# Patient Record
Sex: MALE | Race: WHITE | Employment: OTHER | URBAN - METROPOLITAN AREA
[De-identification: names, ages, dates, MRNs, and addresses within clinical notes are randomized per-mention and may not be internally consistent; named-entity substitution may affect disease eponyms.]

---

## 2017-01-02 ENCOUNTER — PATIENT OUTREACH (OUTPATIENT)
Dept: CASE MANAGEMENT | Age: 82
End: 2017-01-02

## 2017-01-02 NOTE — PROGRESS NOTES
Patient's daughter identified with a potential need for Chronic Care Management services. Called patient to introduce self and availability of Chronic Care Management services.   Patient's daughter informed about the following service elements:  · Health i

## 2017-02-17 ENCOUNTER — HOSPITAL ENCOUNTER (OUTPATIENT)
Dept: CT IMAGING | Facility: HOSPITAL | Age: 82
Discharge: HOME | End: 2017-02-17
Attending: NURSE PRACTITIONER
Payer: MEDICARE

## 2017-02-17 ENCOUNTER — TELEPHONE (OUTPATIENT)
Dept: FAMILY MEDICINE CLINIC | Facility: CLINIC | Age: 82
End: 2017-02-17

## 2017-02-17 ENCOUNTER — OFFICE VISIT (OUTPATIENT)
Dept: FAMILY MEDICINE CLINIC | Facility: CLINIC | Age: 82
End: 2017-02-17

## 2017-02-17 VITALS
SYSTOLIC BLOOD PRESSURE: 150 MMHG | HEART RATE: 76 BPM | TEMPERATURE: 97 F | DIASTOLIC BLOOD PRESSURE: 94 MMHG | RESPIRATION RATE: 24 BRPM

## 2017-02-17 DIAGNOSIS — N18.30 CKD STAGE 3 DUE TO TYPE 2 DIABETES MELLITUS (HCC): Chronic | ICD-10-CM

## 2017-02-17 DIAGNOSIS — E11.22 CKD STAGE 3 DUE TO TYPE 2 DIABETES MELLITUS (HCC): Chronic | ICD-10-CM

## 2017-02-17 DIAGNOSIS — R10.13 EPIGASTRIC ABDOMINAL PAIN: ICD-10-CM

## 2017-02-17 DIAGNOSIS — K21.9 GASTROESOPHAGEAL REFLUX DISEASE WITHOUT ESOPHAGITIS: Chronic | ICD-10-CM

## 2017-02-17 DIAGNOSIS — K43.9 VENTRAL HERNIA WITHOUT OBSTRUCTION OR GANGRENE: ICD-10-CM

## 2017-02-17 DIAGNOSIS — E11.9 CONTROLLED TYPE 2 DIABETES MELLITUS WITHOUT COMPLICATION, WITHOUT LONG-TERM CURRENT USE OF INSULIN (HCC): Chronic | ICD-10-CM

## 2017-02-17 DIAGNOSIS — I10 ESSENTIAL HYPERTENSION: Chronic | ICD-10-CM

## 2017-02-17 DIAGNOSIS — I70.90 ATHEROSCLEROSIS: ICD-10-CM

## 2017-02-17 DIAGNOSIS — R10.12 LUQ PAIN: ICD-10-CM

## 2017-02-17 DIAGNOSIS — E78.00 PURE HYPERCHOLESTEROLEMIA: Chronic | ICD-10-CM

## 2017-02-17 DIAGNOSIS — R10.12 LUQ PAIN: Primary | ICD-10-CM

## 2017-02-17 PROCEDURE — 99214 OFFICE O/P EST MOD 30 MIN: CPT | Performed by: NURSE PRACTITIONER

## 2017-02-17 PROCEDURE — 93000 ELECTROCARDIOGRAM COMPLETE: CPT | Performed by: NURSE PRACTITIONER

## 2017-02-17 PROCEDURE — 74176 CT ABD & PELVIS W/O CONTRAST: CPT

## 2017-02-17 NOTE — PROGRESS NOTES
Rosemary Mcpherson is a 80year old male. S:  Patient presents today with daughter for c/o LUQ/epigastric abdominal pain for the past 5 days with worsening in the last 2 days. Cough and lying down aggravates symptoms.  Denies any runny nose, nasal congesti insulin (hcc)  Ckd stage 3 due to type 2 diabetes mellitus (hcc)    No orders of the defined types were placed in this encounter.        Meds & Refills for this Visit:  No prescriptions requested or ordered in this encounter    Imaging & Consults:  [facility]

## 2017-02-17 NOTE — TELEPHONE ENCOUNTER
Leah Holt states that pt has a cough for past 2-3 days and feels a pulling at the bottom of right ribs. Denies chest pain or SOB. Afebrile. Appt scheduled today to be assessed.

## 2017-02-18 ENCOUNTER — TELEPHONE (OUTPATIENT)
Dept: FAMILY MEDICINE CLINIC | Facility: CLINIC | Age: 82
End: 2017-02-18

## 2017-02-19 ENCOUNTER — HOSPITAL ENCOUNTER (OUTPATIENT)
Facility: HOSPITAL | Age: 82
Setting detail: OBSERVATION
Discharge: HOME | End: 2017-02-22
Attending: EMERGENCY MEDICINE | Admitting: HOSPITALIST
Payer: MEDICARE

## 2017-02-19 ENCOUNTER — APPOINTMENT (OUTPATIENT)
Dept: GENERAL RADIOLOGY | Facility: HOSPITAL | Age: 82
End: 2017-02-19
Attending: EMERGENCY MEDICINE
Payer: MEDICARE

## 2017-02-19 DIAGNOSIS — R10.9 ABDOMINAL PAIN OF UNKNOWN ETIOLOGY: ICD-10-CM

## 2017-02-19 DIAGNOSIS — R07.9 ACUTE CHEST PAIN: Primary | ICD-10-CM

## 2017-02-19 DIAGNOSIS — N28.9 RENAL INSUFFICIENCY: ICD-10-CM

## 2017-02-19 DIAGNOSIS — R33.9 URINARY RETENTION: ICD-10-CM

## 2017-02-19 DIAGNOSIS — N32.0 BLADDER NECK CONTRACTURE: ICD-10-CM

## 2017-02-19 PROBLEM — Z91.81 AT RISK FOR FALLING: Status: ACTIVE | Noted: 2017-02-19

## 2017-02-19 LAB
ALBUMIN SERPL-MCNC: 3.3 G/DL (ref 3.5–4.8)
ALP LIVER SERPL-CCNC: 102 U/L (ref 45–117)
ALT SERPL-CCNC: 16 U/L (ref 17–63)
AST SERPL-CCNC: 21 U/L (ref 15–41)
BASOPHILS # BLD AUTO: 0.05 X10(3) UL (ref 0–0.1)
BASOPHILS NFR BLD AUTO: 0.5 %
BILIRUB SERPL-MCNC: 0.6 MG/DL (ref 0.1–2)
BILIRUB UR QL STRIP.AUTO: NEGATIVE
BUN BLD-MCNC: 27 MG/DL (ref 8–20)
CALCIUM BLD-MCNC: 8.2 MG/DL (ref 8.3–10.3)
CHLORIDE: 106 MMOL/L (ref 101–111)
CLARITY UR REFRACT.AUTO: CLEAR
CO2: 25 MMOL/L (ref 22–32)
COLOR UR AUTO: YELLOW
CREAT BLD-MCNC: 2.09 MG/DL (ref 0.7–1.3)
EOSINOPHIL # BLD AUTO: 0.36 X10(3) UL (ref 0–0.3)
EOSINOPHIL NFR BLD AUTO: 3.9 %
ERYTHROCYTE [DISTWIDTH] IN BLOOD BY AUTOMATED COUNT: 12.8 % (ref 11.5–16)
GLUCOSE BLD-MCNC: 192 MG/DL (ref 70–99)
GLUCOSE BLD-MCNC: 212 MG/DL (ref 65–99)
GLUCOSE UR STRIP.AUTO-MCNC: 50 MG/DL
HCT VFR BLD AUTO: 37.2 % (ref 37–53)
HGB BLD-MCNC: 12.7 G/DL (ref 13–17)
IMMATURE GRANULOCYTE COUNT: 0.03 X10(3) UL (ref 0–1)
IMMATURE GRANULOCYTE RATIO %: 0.3 %
KETONES UR STRIP.AUTO-MCNC: NEGATIVE MG/DL
LEUKOCYTE ESTERASE UR QL STRIP.AUTO: NEGATIVE
LIPASE: 200 U/L (ref 73–393)
LYMPHOCYTES # BLD AUTO: 1.69 X10(3) UL (ref 0.9–4)
LYMPHOCYTES NFR BLD AUTO: 18.1 %
M PROTEIN MFR SERPL ELPH: 7.4 G/DL (ref 6.1–8.3)
MCH RBC QN AUTO: 31.6 PG (ref 27–33.2)
MCHC RBC AUTO-ENTMCNC: 34.1 G/DL (ref 31–37)
MCV RBC AUTO: 92.5 FL (ref 80–99)
MONOCYTES # BLD AUTO: 1.2 X10(3) UL (ref 0.1–0.6)
MONOCYTES NFR BLD AUTO: 12.9 %
NEUTROPHIL ABS PRELIM: 6 X10 (3) UL (ref 1.3–6.7)
NEUTROPHILS # BLD AUTO: 6 X10(3) UL (ref 1.3–6.7)
NEUTROPHILS NFR BLD AUTO: 64.3 %
NITRITE UR QL STRIP.AUTO: NEGATIVE
PH UR STRIP.AUTO: 5 [PH] (ref 4.5–8)
PLATELET # BLD AUTO: 201 10(3)UL (ref 150–450)
POTASSIUM SERPL-SCNC: 4.2 MMOL/L (ref 3.6–5.1)
PROT UR STRIP.AUTO-MCNC: 30 MG/DL
RBC # BLD AUTO: 4.02 X10(6)UL (ref 3.8–5.8)
RBC #/AREA URNS AUTO: >10 /HPF
RED CELL DISTRIBUTION WIDTH-SD: 43.4 FL (ref 35.1–46.3)
SODIUM SERPL-SCNC: 138 MMOL/L (ref 136–144)
SP GR UR STRIP.AUTO: 1.02 (ref 1–1.03)
TROPONIN: <0.046 NG/ML (ref ?–0.05)
UROBILINOGEN UR STRIP.AUTO-MCNC: <2 MG/DL
WBC # BLD AUTO: 9.3 X10(3) UL (ref 4–13)

## 2017-02-19 PROCEDURE — 99220 INITIAL OBSERVATION CARE,LEVL III: CPT | Performed by: HOSPITALIST

## 2017-02-19 PROCEDURE — 71010 XR CHEST AP PORTABLE  (CPT=71010): CPT

## 2017-02-19 RX ORDER — MORPHINE SULFATE 2 MG/ML
2 INJECTION, SOLUTION INTRAMUSCULAR; INTRAVENOUS ONCE
Status: COMPLETED | OUTPATIENT
Start: 2017-02-19 | End: 2017-02-19

## 2017-02-19 RX ORDER — ONDANSETRON 2 MG/ML
4 INJECTION INTRAMUSCULAR; INTRAVENOUS EVERY 4 HOURS PRN
Status: DISCONTINUED | OUTPATIENT
Start: 2017-02-19 | End: 2017-02-20

## 2017-02-19 RX ORDER — LIDOCAINE HYDROCHLORIDE 20 MG/ML
10 JELLY TOPICAL ONCE
Status: COMPLETED | OUTPATIENT
Start: 2017-02-19 | End: 2017-02-19

## 2017-02-19 RX ORDER — ASPIRIN 81 MG/1
324 TABLET, CHEWABLE ORAL ONCE
Status: COMPLETED | OUTPATIENT
Start: 2017-02-19 | End: 2017-02-19

## 2017-02-19 RX ORDER — SULFAMETHOXAZOLE AND TRIMETHOPRIM 800; 160 MG/1; MG/1
1 TABLET ORAL EVERY 12 HOURS SCHEDULED
Status: COMPLETED | OUTPATIENT
Start: 2017-02-19 | End: 2017-02-20

## 2017-02-19 NOTE — ED INITIAL ASSESSMENT (HPI)
Pt. Complaining of chest pain that started after eating. Lasted for 3 mins. Worst when he laid down.

## 2017-02-20 ENCOUNTER — ANESTHESIA EVENT (OUTPATIENT)
Dept: SURGERY | Facility: HOSPITAL | Age: 82
End: 2017-02-20
Payer: MEDICARE

## 2017-02-20 ENCOUNTER — APPOINTMENT (OUTPATIENT)
Dept: CV DIAGNOSTICS | Facility: HOSPITAL | Age: 82
End: 2017-02-20
Attending: HOSPITALIST
Payer: MEDICARE

## 2017-02-20 PROBLEM — R33.8 ACUTE RETENTION OF URINE: Status: ACTIVE | Noted: 2017-02-20

## 2017-02-20 LAB
ATRIAL RATE: 80 BPM
BUN BLD-MCNC: 23 MG/DL (ref 8–20)
CALCIUM BLD-MCNC: 8.2 MG/DL (ref 8.3–10.3)
CHLORIDE: 107 MMOL/L (ref 101–111)
CO2: 26 MMOL/L (ref 22–32)
CREAT BLD-MCNC: 1.88 MG/DL (ref 0.7–1.3)
ERYTHROCYTE [DISTWIDTH] IN BLOOD BY AUTOMATED COUNT: 12.9 % (ref 11.5–16)
EST. AVERAGE GLUCOSE BLD GHB EST-MCNC: 154 MG/DL (ref 68–126)
GLUCOSE BLD-MCNC: 108 MG/DL (ref 70–99)
GLUCOSE BLD-MCNC: 121 MG/DL (ref 65–99)
GLUCOSE BLD-MCNC: 136 MG/DL (ref 65–99)
GLUCOSE BLD-MCNC: 138 MG/DL (ref 65–99)
GLUCOSE BLD-MCNC: 167 MG/DL (ref 65–99)
GLUCOSE BLD-MCNC: 92 MG/DL (ref 65–99)
HBA1C MFR BLD HPLC: 7 % (ref ?–5.7)
HCT VFR BLD AUTO: 36.3 % (ref 37–53)
HGB BLD-MCNC: 12 G/DL (ref 13–17)
MCH RBC QN AUTO: 31.3 PG (ref 27–33.2)
MCHC RBC AUTO-ENTMCNC: 33.1 G/DL (ref 31–37)
MCV RBC AUTO: 94.5 FL (ref 80–99)
P AXIS: -14 DEGREES
P-R INTERVAL: 156 MS
PLATELET # BLD AUTO: 174 10(3)UL (ref 150–450)
POTASSIUM SERPL-SCNC: 4.1 MMOL/L (ref 3.6–5.1)
Q-T INTERVAL: 400 MS
QRS DURATION: 80 MS
QTC CALCULATION (BEZET): 461 MS
R AXIS: 55 DEGREES
RBC # BLD AUTO: 3.84 X10(6)UL (ref 3.8–5.8)
RED CELL DISTRIBUTION WIDTH-SD: 44.2 FL (ref 35.1–46.3)
SODIUM SERPL-SCNC: 139 MMOL/L (ref 136–144)
T AXIS: 60 DEGREES
TROPONIN: <0.046 NG/ML (ref ?–0.05)
VENTRICULAR RATE: 80 BPM
WBC # BLD AUTO: 8.2 X10(3) UL (ref 4–13)

## 2017-02-20 PROCEDURE — 93306 TTE W/DOPPLER COMPLETE: CPT

## 2017-02-20 PROCEDURE — 93306 TTE W/DOPPLER COMPLETE: CPT | Performed by: INTERNAL MEDICINE

## 2017-02-20 PROCEDURE — 99225 SUBSEQUENT OBSERVATION CARE: CPT | Performed by: INTERNAL MEDICINE

## 2017-02-20 RX ORDER — ACETAMINOPHEN 325 MG/1
650 TABLET ORAL EVERY 6 HOURS PRN
Status: DISCONTINUED | OUTPATIENT
Start: 2017-02-20 | End: 2017-02-20

## 2017-02-20 RX ORDER — ACETAMINOPHEN 325 MG/1
650 TABLET ORAL EVERY 4 HOURS PRN
Status: DISCONTINUED | OUTPATIENT
Start: 2017-02-20 | End: 2017-02-22

## 2017-02-20 RX ORDER — HYDRALAZINE HYDROCHLORIDE 20 MG/ML
10 INJECTION INTRAMUSCULAR; INTRAVENOUS
Status: DISCONTINUED | OUTPATIENT
Start: 2017-02-20 | End: 2017-02-22

## 2017-02-20 RX ORDER — POLYETHYLENE GLYCOL 3350 17 G/17G
17 POWDER, FOR SOLUTION ORAL DAILY PRN
Status: DISCONTINUED | OUTPATIENT
Start: 2017-02-20 | End: 2017-02-22

## 2017-02-20 RX ORDER — HYDROCODONE BITARTRATE AND ACETAMINOPHEN 5; 325 MG/1; MG/1
1 TABLET ORAL EVERY 4 HOURS PRN
Status: DISCONTINUED | OUTPATIENT
Start: 2017-02-20 | End: 2017-02-22

## 2017-02-20 RX ORDER — HEPARIN SODIUM 5000 [USP'U]/ML
5000 INJECTION, SOLUTION INTRAVENOUS; SUBCUTANEOUS EVERY 8 HOURS
Status: DISCONTINUED | OUTPATIENT
Start: 2017-02-20 | End: 2017-02-22

## 2017-02-20 RX ORDER — HYDRALAZINE HYDROCHLORIDE 25 MG/1
25 TABLET, FILM COATED ORAL 2 TIMES DAILY
Status: DISCONTINUED | OUTPATIENT
Start: 2017-02-20 | End: 2017-02-21

## 2017-02-20 RX ORDER — DOCUSATE SODIUM 100 MG/1
100 CAPSULE, LIQUID FILLED ORAL 2 TIMES DAILY
Status: DISCONTINUED | OUTPATIENT
Start: 2017-02-20 | End: 2017-02-22

## 2017-02-20 RX ORDER — PANTOPRAZOLE SODIUM 40 MG/1
40 TABLET, DELAYED RELEASE ORAL
Status: DISCONTINUED | OUTPATIENT
Start: 2017-02-20 | End: 2017-02-22

## 2017-02-20 RX ORDER — DIPHENHYDRAMINE HCL 25 MG
25 CAPSULE ORAL NIGHTLY PRN
Status: DISCONTINUED | OUTPATIENT
Start: 2017-02-20 | End: 2017-02-22

## 2017-02-20 RX ORDER — ASPIRIN 325 MG
325 TABLET, DELAYED RELEASE (ENTERIC COATED) ORAL DAILY
Status: DISCONTINUED | OUTPATIENT
Start: 2017-02-20 | End: 2017-02-22

## 2017-02-20 RX ORDER — DEXTROSE MONOHYDRATE 25 G/50ML
50 INJECTION, SOLUTION INTRAVENOUS
Status: DISCONTINUED | OUTPATIENT
Start: 2017-02-20 | End: 2017-02-22

## 2017-02-20 RX ORDER — LOSARTAN POTASSIUM 50 MG/1
50 TABLET ORAL DAILY
Status: DISCONTINUED | OUTPATIENT
Start: 2017-02-20 | End: 2017-02-22

## 2017-02-20 RX ORDER — ATORVASTATIN CALCIUM 20 MG/1
20 TABLET, FILM COATED ORAL
Status: DISCONTINUED | OUTPATIENT
Start: 2017-02-20 | End: 2017-02-22

## 2017-02-20 RX ORDER — METOPROLOL TARTRATE 100 MG/1
100 TABLET ORAL
Status: DISCONTINUED | OUTPATIENT
Start: 2017-02-20 | End: 2017-02-22

## 2017-02-20 RX ORDER — HYDROCODONE BITARTRATE AND ACETAMINOPHEN 5; 325 MG/1; MG/1
2 TABLET ORAL EVERY 4 HOURS PRN
Status: DISCONTINUED | OUTPATIENT
Start: 2017-02-20 | End: 2017-02-22

## 2017-02-20 RX ORDER — SODIUM PHOSPHATE, DIBASIC AND SODIUM PHOSPHATE, MONOBASIC 7; 19 G/133ML; G/133ML
1 ENEMA RECTAL ONCE AS NEEDED
Status: ACTIVE | OUTPATIENT
Start: 2017-02-20 | End: 2017-02-20

## 2017-02-20 RX ORDER — BISACODYL 10 MG
10 SUPPOSITORY, RECTAL RECTAL
Status: DISCONTINUED | OUTPATIENT
Start: 2017-02-20 | End: 2017-02-22

## 2017-02-20 RX ORDER — ONDANSETRON 2 MG/ML
4 INJECTION INTRAMUSCULAR; INTRAVENOUS EVERY 6 HOURS PRN
Status: DISCONTINUED | OUTPATIENT
Start: 2017-02-20 | End: 2017-02-22

## 2017-02-20 NOTE — ANESTHESIA PREPROCEDURE EVALUATION
PRE-OP EVALUATION    Patient Name: June Huerta    Pre-op Diagnosis: INPT    Procedure(s):  CYSTOSCOPY, TRANSURETHRAL INCISION OF BLADDER NECK CONTRACTURE     Surgeon(s) and Role:     * Jasmyn Hernández MD - Primary    Pre-op vitals reviewed.   Temp: 98.2 Subcutaneous TID CC and HS   diphenhydrAMINE (BENADRYL) cap/tab 25 mg 25 mg Oral Nightly PRN   [START ON 2/21/2017] CeFAZolin Sodium (ANCEF/KEFZOL) IVPB premix 2 g 2 g Intravenous On Call to OR   [COMPLETED] aspirin chewable tab 324 mg 324 mg Oral Once   [ Neuro/Psych                              [place]  Macular degeneration  BPH      ECHO:  Left ventricle: The cavity size was normal. Wall thickness was normal.     Systolic function was normal. The estimated ejection fraction was 55-60%.      There was no Plan: general  NPO status verified and patient meets guidelines. Patient has taken beta blockers in last 24 hours. Post-procedure pain management plan discussed with surgeon and patient.       Plan/risks discussed with: patient and child/children

## 2017-02-20 NOTE — PROGRESS NOTES
[facility]  Urology Progress Note    Monique Folod Patient Status:  Observation    3/16/1929 MRN [id number]   [facility] 8NE-A Attending Sushant Landis MD   Hosp Day # 1 PCP Marlys Foley MD     Subjective:   Monique Flood is a(n) 8 anesthesia risks, bleeding, infection with patient and daughter who agree and understand.      Await cardiac clearance  NPO after MN  Hold aspirin and heparin the morning of procedure  Consent to be signed  Ancef on call to [address]  [facility]

## 2017-02-20 NOTE — PHYSICAL THERAPY NOTE
PHYSICAL THERAPY QUICK EVALUATION - INPATIENT    Room Number: 8934/2024-O  Evaluation Date: 2/20/2017  Presenting Problem: acute abdominal and chest pain  Physician Order: PT Eval and Treat    Problem List  Principal Problem:    Acute chest pain  Active Pr unfamiliar environment and higher risk of falling. Pt and daughter confirm no recent falls. SUBJECTIVE  \"I haven't moved much since yesterday so I feel a little off balance. \"    OBJECTIVE  Precautions: Hard of hearing (legal blindness)  Fall Risk: Hig is corrected with verbal/tactile cues. Pt ambulates x 300 ft c HHA, Min A. Pt demonstrates decreased branden, wide KRISTIAN, high guard position of UEs, and decreased B foot clearance/step lengths.  Pt able to improve posture and increase step lengths/foot angelito patient) at least TID in order to prevent functional decline. PT Discharge Recommendations: 24 hour care/supervision;Home with home health PT    PLAN  Patient has been evaluated and presents with no skilled Physical Therapy needs at this time.   Sergio

## 2017-02-20 NOTE — ED NOTES
attempt to insert gonzalez cath fr 16 unsuccesful. Tried 16 fr coude cath. Unsuccessful. Able to drain <200ml of urine but unable to fully insert cath. initial urine was clear. succeeding  Urine was little bloody. Dr roth informed.

## 2017-02-20 NOTE — ED PROVIDER NOTES
Patient Seen in: [facility] 8ne-a    History   Patient presents with:  Chest Pain Angina (cardiovascular)    Stated Complaint: chest pain    HPI    Patient is a 78-year-old male who over the past week has had epigastric abdominal pain.   Patient was EC,  Take 1 tablet by mouth daily.        Family History   Problem Relation Age of Onset   • Cancer Father    • pneumonia [Other] [OTHER] Mother          Smoking Status: Former Smoker                   Packs/Day: 0.00  Years:           Quit date: 03/03/2002 Creatinine 2.09 (*)     GFR 28 (*)     Calcium, Total 8.2 (*)     Alt 16 (*)     Albumin 3.3 (*)     All other components within normal limits   URINALYSIS WITH CULTURE REFLEX - Abnormal; Notable for the following:     Glucose Urine 50  (*)     Blood Urine Minus Newton.         Disposition and Plan     Clinical Impression:  Acute chest pain  (primary encounter diagnosis)  Abdominal pain of unknown etiology  Urinary retention  Renal insufficiency    Disposition:  Admit    Follow-up:  No follow-up provider specifi

## 2017-02-20 NOTE — H&P
[facility] HOSPITAL[facility]  History and Physical     Brook Hien Patient Status:  Observation    3/16/1929 MRN [id number]   [facility] 8NE-A Attending Zita Rivas [address] Day # 1 PCP Kathy Gotti MD     Chief Complaint: Richard Gentry SURGICAL HISTORY      Comment polys remove by [facility]       Social History:  reports that he quit smoking about 14 years ago. He has never used smokeless tobacco. He reports that he does not drink alcohol or use illicit drugs.     Family History:   Family H gallops. Equal pulses. Chest and Back: No tenderness or deformity. Abdomen: Soft, nontender, nondistended. Positive bowel sounds. No rebound, guarding or organomegaly. Neurologic: No focal neurological deficits. CNII-XII grossly intact.   Musculoskelet Lawrence.  9.  Blindness-legally blind secondary to macular degeneration. Quality:  · DVT Prophylaxis: Heparin  · CODE status: Full  · Bravo: Placed in the ER    Plan of care discussed with patient and daughter at bedside.     Skylar Marmolejo DO  2

## 2017-02-20 NOTE — CONSULTS
[facility]  Report of Consultation    Shoaib Douglas Patient Status:  Observation    3/16/1929 MRN [id number]   [facility] 8NE-A Attending Mahesh Sal MD   Hosp Day # 1 PCP Shira Beth MD     Reason for Consultation:  Cardiac angelito Trivial regurgitation. 3. Left atrium: The left atrial volume was mildly increased. 4. Right ventricle: The cavity size was mildly increased. Impressions:  This study is compared with previous dated 08/31/2015: NO  INTERVAL CHANGES.     Stress Test: 08/2 (APRESOLINE) tab 25 mg, 25 mg, Oral, BID  •  Losartan Potassium (COZAAR) tab 50 mg, 50 mg, Oral, Daily  •  Metoprolol Tartrate (LOPRESSOR) tab 100 mg, 100 mg, Oral, Daily Beta Blocker  •  Pantoprazole Sodium (PROTONIX) EC tab 40 mg, 40 mg, Oral, QAM AC  • Max:98.6 °F (37 °C)    Wt Readings from Last 3 Encounters:  02/19/17 : 163 lb (73.936 kg)  10/14/16 : 164 lb (74.39 kg)  04/15/16 : 164 lb (74.39 kg)      General: Alert and oriented in no apparent distress.   HEENT: legally blind, hearing deficit  Neck: No in caliber. No evidence of obstruction. Colonic diverticulosis without evidence of diverticulitis. Postsurgical changes noted within the ascending portion of the colon. Small bowel containing ventral hernias.  PELVIS:  Distended bladder with trabeculations 13.3 11/10/2013   PT 13.4 10/17/2011   INR 1.05 11/10/2013   INR 1.01 10/17/2011          Lab Results  Component Value Date   WBC 8.2 02/20/2017   HGB 12.0 02/20/2017   HCT 36.3 02/20/2017   .0 02/20/2017   CREATSERUM 1.88 02/20/2017   BUN 23 02/20/ evidence for regional wall motion abnormalities.     Doppler parameters are consistent with abnormal left ventricular     relaxation - grade 1 diastolic dysfunction. 2. Aortic valve: Trivial regurgitation.   3. Left atrium: The left atrial volume was mildl

## 2017-02-20 NOTE — CONSULTS
[facility]  Report of Consultation    Sahil Haynes Patient Status:  Emergency    3/16/1929 MRN [id number]   Location [address] [facility] Attending Xenia Uriarte MD   Hosp Day # 0 PCP Evelin Harmon MD     Impression and Plan:  Ur 100 MG Oral Tab Take 1 tablet (100 mg total) by mouth daily. Disp: 90 tablet Rfl: 3   Losartan Potassium (COZAAR) 50 MG Oral Tab Take 1 tablet (50 mg total) by mouth daily.  Disp: 90 tablet Rfl: 3   glyBURIDE 2.5 MG Oral Tab Take 1 tablet (2.5 mg total) by 76  Temp(Src) 98.6 °F (37 °C) (Temporal)  Resp 16  Ht 5' 7\" (1.702 m)  Wt 163 lb (73.936 kg)  BMI 25.52 kg/m2  SpO2 96%  General: Alert, orientated x3. Cooperative. No apparent distress. HEENT: Exam is unremarkable.    Abdomen:  Soft, non-distended, non through it and then [place] sounds were used to dilated from 12 [place] up to 32 [place].   An 25 [place] Crow tip catheter and a 12 [place] Crow tip catheter could not be advanced into the bladder but a 15 Azeri catheter was advanced over the guidewire

## 2017-02-20 NOTE — HISTORICAL OFFICE NOTE
Luke Glendale  : 1929  ACCOUNT:  [id number]  [phone]  PCP: Dr. Naeem Cohen     TODAY'S DATE: 2016  DICTATED BY:  Lorina Feil Frommelt, APN]    CHIEF COMPLAINT: [Followup of .  CAD, of native vessels.]    HPI: [On 2016, mayuri Rivas see below    VITAL SIGNS: [B/P - 130/80, Pulse - 62, Weight - 164, Height - 66, BMI - 26.5 ]    CONS: well developed, well nourished. WEIGHT: BMI parameters reviewed and discussed. HEAD/FACE: no trauma and normocephalic. EYES: severe macular degeneration. EVELYN Tucker - DD: 2016 - DT: 2016 - Job ID: [id number]    ___________________________________________________________    Armani Rasheed  : 1929  ACCOUNT:  [id number]  [phone]  PCP: Dr. Kamala Rea    TODAY'S DATE: exercise. DIET: no special diet. MARITAL STATUS: . OCCUPATION: retired.     ALLERGIES: No Known Allergies    MEDICATIONS: Selected prescriptions see below    VITAL SIGNS: [B/P - 114/60 , Pulse - 64, Weight -  165, Height -   66 , BMI - 26.6 ]    CONS

## 2017-02-20 NOTE — PROGRESS NOTES
[facility]ITAL[facility]  Progress Note     Debby Kelley Patient Status:  Observation    3/16/1929 MRN [id number]   [facility] 8NE-A Attending Maylin Chiu MD   Hosp Day # 1 PCP Emma Olivarez MD     Chief Complaint: chest pain with abdominal Sulfamethoxazole-TMP DS  1 tablet Oral 2 times per day       ASSESSMENT / PLAN:     1. 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  Resolved after gonzalez   Appreciate uro consult: requires outpat

## 2017-02-20 NOTE — PLAN OF CARE
Pt admit overnight w/ cp. Trops have been (-) x2. Pt A&O, denies pain. Legally blind, [place].  VSS. On RA. NSR on tele. Bravo cath remains in place- inserted by urology for retention. Pt to have surgery tomorrow- hold subQ heparin and aspirin.   Pt and erma

## 2017-02-20 NOTE — DISCHARGE SUMMARY
[facility]PITAL[facility]  DISCHARGE SUMMARY     Gianna Canales Patient Status:  Observation    3/16/1929 MRN [id number]   [facility] 8NE-A Attending Buddy Warner MD   [facility] Day # 3 PCP Argelia Garcia MD     Date of Admission: 2017  Date of D dizziness, lightheadedness, or syncope.  No headache, sinus/nasal congestion, or cough.  Patient denies any numbness or tingling in extremities or any focal weakness. Brief Synopsis: Patient was admitted after urology placed a Bravo in the ER overnight. Greek resectoscope sheath was placed under direct vision. The hot knife was used to incise the bladder neck at the 3, 6, and 9 o'clock positions. The view from the veru was wide open after resection.  Hemostasis was noted to be excellent with minimal use o tablet   Refills:  1       Losartan Potassium 50 MG Tabs   Last time this was given:  50 mg on 2/22/2017  8:50 AM   Commonly known as:  COZAAR        Take 1 tablet (50 mg total) by mouth daily.     Quantity:  90 tablet   Refills:  3       Metoprolol Tartrat deficits. Musculoskeletal: Moves all extremities. Extremities: No edema.   -----------------------------------------------------------------------------------------------  PATIENT DISCHARGE INSTRUCTIONS: See electronic chart    Vita Lindquist MD 2/22/2017

## 2017-02-20 NOTE — CM/SW NOTE
02/20/17 1400   CM/SW Referral Data   Referral Source Physician   Reason for Referral Discharge planning   Informant Children   Pertinent Medical Hx   Primary Care Physician Name Arpita Shellpam   Patient Info   Patient Communication Issues Hearing impairm

## 2017-02-21 ENCOUNTER — ANESTHESIA (OUTPATIENT)
Dept: SURGERY | Facility: HOSPITAL | Age: 82
End: 2017-02-21
Payer: MEDICARE

## 2017-02-21 ENCOUNTER — SURGERY (OUTPATIENT)
Age: 82
End: 2017-02-21

## 2017-02-21 LAB
GLUCOSE BLD-MCNC: 132 MG/DL (ref 65–99)
GLUCOSE BLD-MCNC: 219 MG/DL (ref 65–99)
GLUCOSE BLD-MCNC: 93 MG/DL (ref 65–99)

## 2017-02-21 PROCEDURE — 0TND8ZZ RELEASE URETHRA, VIA NATURAL OR ARTIFICIAL OPENING ENDOSCOPIC: ICD-10-PCS | Performed by: UROLOGY

## 2017-02-21 PROCEDURE — 99225 SUBSEQUENT OBSERVATION CARE: CPT | Performed by: INTERNAL MEDICINE

## 2017-02-21 RX ORDER — HYDROCODONE BITARTRATE AND ACETAMINOPHEN 5; 325 MG/1; MG/1
1 TABLET ORAL AS NEEDED
Status: DISCONTINUED | OUTPATIENT
Start: 2017-02-21 | End: 2017-02-21 | Stop reason: HOSPADM

## 2017-02-21 RX ORDER — NALOXONE HYDROCHLORIDE 0.4 MG/ML
80 INJECTION, SOLUTION INTRAMUSCULAR; INTRAVENOUS; SUBCUTANEOUS AS NEEDED
Status: DISCONTINUED | OUTPATIENT
Start: 2017-02-21 | End: 2017-02-21 | Stop reason: HOSPADM

## 2017-02-21 RX ORDER — ONDANSETRON 2 MG/ML
4 INJECTION INTRAMUSCULAR; INTRAVENOUS AS NEEDED
Status: DISCONTINUED | OUTPATIENT
Start: 2017-02-21 | End: 2017-02-21 | Stop reason: HOSPADM

## 2017-02-21 RX ORDER — METOCLOPRAMIDE HYDROCHLORIDE 5 MG/ML
10 INJECTION INTRAMUSCULAR; INTRAVENOUS AS NEEDED
Status: DISCONTINUED | OUTPATIENT
Start: 2017-02-21 | End: 2017-02-21 | Stop reason: HOSPADM

## 2017-02-21 RX ORDER — HYDROMORPHONE HYDROCHLORIDE 1 MG/ML
0.4 INJECTION, SOLUTION INTRAMUSCULAR; INTRAVENOUS; SUBCUTANEOUS EVERY 5 MIN PRN
Status: DISCONTINUED | OUTPATIENT
Start: 2017-02-21 | End: 2017-02-21 | Stop reason: HOSPADM

## 2017-02-21 RX ORDER — CEFAZOLIN SODIUM 1 G/3ML
INJECTION, POWDER, FOR SOLUTION INTRAMUSCULAR; INTRAVENOUS
Status: DISCONTINUED | OUTPATIENT
Start: 2017-02-21 | End: 2017-02-21 | Stop reason: HOSPADM

## 2017-02-21 RX ORDER — HYDRALAZINE HYDROCHLORIDE 25 MG/1
25 TABLET, FILM COATED ORAL EVERY 8 HOURS SCHEDULED
Status: DISCONTINUED | OUTPATIENT
Start: 2017-02-21 | End: 2017-02-22

## 2017-02-21 RX ORDER — HYDROCODONE BITARTRATE AND ACETAMINOPHEN 5; 325 MG/1; MG/1
2 TABLET ORAL AS NEEDED
Status: DISCONTINUED | OUTPATIENT
Start: 2017-02-21 | End: 2017-02-21 | Stop reason: HOSPADM

## 2017-02-21 RX ORDER — DEXTROSE MONOHYDRATE 25 G/50ML
50 INJECTION, SOLUTION INTRAVENOUS
Status: DISCONTINUED | OUTPATIENT
Start: 2017-02-21 | End: 2017-02-21 | Stop reason: HOSPADM

## 2017-02-21 RX ORDER — SODIUM CHLORIDE, SODIUM LACTATE, POTASSIUM CHLORIDE, CALCIUM CHLORIDE 600; 310; 30; 20 MG/100ML; MG/100ML; MG/100ML; MG/100ML
INJECTION, SOLUTION INTRAVENOUS CONTINUOUS
Status: DISCONTINUED | OUTPATIENT
Start: 2017-02-21 | End: 2017-02-22

## 2017-02-21 NOTE — PROGRESS NOTES
[facility]  Cardiology Progress Note    Eden Mendez Patient Status:  Observation    3/16/1929 MRN [id number]   [facility] 8NE-A Attending Jaye Watson MD   [facility] Day # 2 PCP Lalo Meyers MD     Subjective:  C/o nausea this morning

## 2017-02-21 NOTE — ANESTHESIA POSTPROCEDURE EVALUATION
[facility] Patient Status:  Observation   Age/Gender 80year old male MRN [id number]   [facility] SURGERY Attending Yoshi Disla MD   [facility] Day # 2 PCP Noé Juarez MD       Anesthesia Post-op Note    Procedure(s):  CY

## 2017-02-21 NOTE — PLAN OF CARE
Pt admit w/ cp s/t urinary retention- urethral dilation performed in ED w/ placement of gonzalez catheter. Plan for cystoscopy today. Pt A&O. Very [place]- R hearing aid in place. Legally blind. VSS. On RA. NSR on tele.    Gonzalez cath remains in place- scant se

## 2017-02-21 NOTE — PROGRESS NOTES
[facility]ITAL[facility]  Progress Note     Magalis Nava Patient Status:  Observation    3/16/1929 MRN [id number]   [facility] 8NE-A Attending Miller Hernandez MD   Hosp Day # 2 PCP Owen Hong MD     Chief Complaint: chest pain with abdominal Tartrate  100 mg Oral Daily Beta Blocker   • Pantoprazole Sodium  40 mg Oral QAM AC   • Heparin Sodium (Porcine)  5,000 Units Subcutaneous Q8H   • docusate sodium  100 mg Oral BID   • insulin aspart  1-50 Units Subcutaneous TID CC and HS   • ceFAZolin  2 g

## 2017-02-21 NOTE — HOME CARE LIAISON
Met with pt's daughter Che Vergara to discuss home care. Informed that PT has recommended home physical therapy. Pt may also need gonzalez at home.  Che Vergara declines any HHC stating that she has experience with gonzalez care and she also feels he is likely to decline phy

## 2017-02-21 NOTE — OPERATIVE REPORT
[facility]  Urology Procedure Note  Elsie Plata Patient Status:  Observation    3/16/1929 MRN [id number]   [facility] SURGERY Attending Aman Ruiz MD   [facility] Day # 2 PCP Naeem Cohen MD     Procedure: Cystoscopy and transurethra artem.  The resectoscope was removed and a 16 Swedish gonzalez was placed. The balloon was inflated with 15 mL of sterile water. The patient tolerated the procedure well. Surgeon: BIMAL Salazar    Anesthesia:  General    Findings:  Bladder neck contracture.   Hea

## 2017-02-22 VITALS
OXYGEN SATURATION: 96 % | DIASTOLIC BLOOD PRESSURE: 64 MMHG | HEIGHT: 67 IN | RESPIRATION RATE: 16 BRPM | BODY MASS INDEX: 25.58 KG/M2 | HEART RATE: 71 BPM | SYSTOLIC BLOOD PRESSURE: 120 MMHG | TEMPERATURE: 98 F | WEIGHT: 163 LBS

## 2017-02-22 LAB
BUN BLD-MCNC: 31 MG/DL (ref 8–20)
CALCIUM BLD-MCNC: 8 MG/DL (ref 8.3–10.3)
CHLORIDE: 108 MMOL/L (ref 101–111)
CO2: 22 MMOL/L (ref 22–32)
CREAT BLD-MCNC: 2.16 MG/DL (ref 0.7–1.3)
GLUCOSE BLD-MCNC: 112 MG/DL (ref 65–99)
GLUCOSE BLD-MCNC: 112 MG/DL (ref 70–99)
GLUCOSE BLD-MCNC: 258 MG/DL (ref 65–99)
POTASSIUM SERPL-SCNC: 3.6 MMOL/L (ref 3.6–5.1)
POTASSIUM SERPL-SCNC: 4 MMOL/L (ref 3.6–5.1)
SODIUM SERPL-SCNC: 139 MMOL/L (ref 136–144)

## 2017-02-22 PROCEDURE — 99217 OBSERVATION CARE DISCHARGE: CPT | Performed by: INTERNAL MEDICINE

## 2017-02-22 RX ORDER — CEFUROXIME AXETIL 500 MG/1
500 TABLET ORAL DAILY
Qty: 3 TABLET | Refills: 0 | Status: SHIPPED | OUTPATIENT
Start: 2017-02-22 | End: 2017-02-25

## 2017-02-22 RX ORDER — POTASSIUM CHLORIDE 20 MEQ/1
40 TABLET, EXTENDED RELEASE ORAL ONCE
Status: COMPLETED | OUTPATIENT
Start: 2017-02-22 | End: 2017-02-22

## 2017-02-22 RX ORDER — ACETAMINOPHEN 500 MG
1000 TABLET ORAL EVERY 6 HOURS PRN
Qty: 30 TABLET | Refills: 0 | Status: SHIPPED | OUTPATIENT
Start: 2017-02-22 | End: 2018-11-23 | Stop reason: ALTCHOICE

## 2017-02-22 NOTE — CM/SW NOTE
02/22/17 1700   Discharge disposition   Discharged to: Home or [address] [place] [address] services after discharge Patient refused services   Discharge transportation Private car

## 2017-02-22 NOTE — PROGRESS NOTES
[facility]  Urology Progress Note    Emily Rea Patient Status:  Observation    3/16/1929 MRN [id number]   [facility] 8NE-A Attending Nimisha Praedes MD   [facility] Day # 3 PCP Mounika Knwoles MD     Subjective:   Emily Rea is a(n) 8

## 2017-02-22 NOTE — PROGRESS NOTES
[facility] Cardiology Progress Note    Subjective:  Happy to be going home, minimal discomfort.      Objective:  /64 mmHg  Pulse 71  Temp(Src) 97.9 °F (36.6 °C) (Oral)  Resp 16  Ht 5' 7\" (1.702 m)  Wt 163 lb (73.936 kg)  BMI 25.52 kg/m2  SpO2 96%    Tel

## 2017-02-22 NOTE — PROGRESS NOTES
NURSING DISCHARGE NOTE      Pt discharged to home w/ daughter in stable condition. Bravo cath in place, to be removed at f/u w/ urology. Discharge instructions provided and reviewed w/ pt and daughter.   Prescriptions, medication list, and follow-up

## 2017-02-22 NOTE — PROGRESS NOTES
[facility]  Progress Note     Debby Kelley Patient Status:  Observation    3/16/1929 MRN [id number]   [facility] 8NE-A Attending Maylin Chiu MD   Hosp Day # 3 PCP Emma Olivarez MD     Chief Complaint: chest pain with abdominal • Losartan Potassium  50 mg Oral Daily   • Metoprolol Tartrate  100 mg Oral Daily Beta Blocker   • Pantoprazole Sodium  40 mg Oral QAM AC   • Heparin Sodium (Porcine)  5,000 Units Subcutaneous Q8H   • docusate sodium  100 mg Oral BID   • insulin aspart

## 2017-02-22 NOTE — PLAN OF CARE
Pt cleared for discharge today. Bravo to remain in place- draining pink/ red urine. Pt has been ambulating, feels great. Daughter, Parth Rose, updated frequently on plan. Will monitor.

## 2017-02-24 ENCOUNTER — PATIENT OUTREACH (OUTPATIENT)
Dept: CASE MANAGEMENT | Age: 82
End: 2017-02-24

## 2017-02-24 DIAGNOSIS — R33.8 ACUTE RETENTION OF URINE: Primary | ICD-10-CM

## 2017-02-24 NOTE — PROGRESS NOTES
Initial Post Discharge Follow Up   Discharge Date: 2/22/17  Contact Date: 2/24/2017    Consent Verification:  Assessment Completed With: Caregiver: mychal Theodore received per patient?  written  HIPAA Verified? Yes    1.  Tell me why you were in the 20 MG Oral Tab Take 1 tablet (20 mg total) by mouth once daily. Disp: 30 tablet Rfl: 11   aspirin 325 MG Oral Tab EC Take 1 tablet by mouth daily. Disp:  Rfl:        4. What questions do you have about your medications? None. 5.  How often do you forget [address] [phone]               Interventions:  D/c instructions reviewed with dtr. Bravo care reviewed with dtr in case catheter is not removed today.   dtr stated [place]

## 2017-03-06 ENCOUNTER — TELEPHONE (OUTPATIENT)
Dept: FAMILY MEDICINE CLINIC | Facility: CLINIC | Age: 82
End: 2017-03-06

## 2017-03-06 NOTE — TELEPHONE ENCOUNTER
Daughter reports the nose bleed happen once, today only, it lasted a few minutes and stopped after pinching bridge of nose and leaning forward, no other Sx, at lunch, fine now, discussed low humidity and heat in house can cause this, plus Pt on aspirin, al

## 2017-03-06 NOTE — TELEPHONE ENCOUNTER
Patient's daughter would like to speak to nurse. Patient experiencing nose bleeds. Please contact daughter.

## 2017-03-25 DIAGNOSIS — E11.22 TYPE 2 DIABETES MELLITUS WITH STAGE 3 CHRONIC KIDNEY DISEASE, UNSPECIFIED LONG TERM INSULIN USE STATUS: Primary | ICD-10-CM

## 2017-03-25 DIAGNOSIS — N18.3 TYPE 2 DIABETES MELLITUS WITH STAGE 3 CHRONIC KIDNEY DISEASE, UNSPECIFIED LONG TERM INSULIN USE STATUS: Primary | ICD-10-CM

## 2017-04-04 RX ORDER — HYDRALAZINE HYDROCHLORIDE 25 MG/1
TABLET, FILM COATED ORAL
Qty: 180 TABLET | Refills: 1 | Status: SHIPPED | OUTPATIENT
Start: 2017-04-04 | End: 2017-09-24

## 2017-04-04 NOTE — TELEPHONE ENCOUNTER
Refill for Hydralazine requested. LOV 02/17/17 and labs 02/20/17. Will you approve ? Routed to Dr Claudy Wylie.

## 2017-04-18 ENCOUNTER — TELEPHONE (OUTPATIENT)
Dept: FAMILY MEDICINE CLINIC | Facility: CLINIC | Age: 82
End: 2017-04-18

## 2017-04-18 DIAGNOSIS — E11.8 TYPE 2 DIABETES MELLITUS WITH COMPLICATION, WITHOUT LONG-TERM CURRENT USE OF INSULIN (HCC): Primary | ICD-10-CM

## 2017-04-18 NOTE — TELEPHONE ENCOUNTER
Patient's daughter called to schedule blood work but there are no orders. Patient's daughter would like to know if patient needs blood work done before appointment 04/28/17.  If patient needs blood work please place orders to Stanton Alex

## 2017-04-21 DIAGNOSIS — N18.2 CKD (CHRONIC KIDNEY DISEASE) STAGE 2, GFR 60-89 ML/MIN: ICD-10-CM

## 2017-04-21 DIAGNOSIS — I10 ESSENTIAL HYPERTENSION: Primary | ICD-10-CM

## 2017-04-21 RX ORDER — LOSARTAN POTASSIUM 50 MG/1
TABLET ORAL
Qty: 90 TABLET | Refills: 2 | Status: SHIPPED | OUTPATIENT
Start: 2017-04-21 | End: 2017-11-16

## 2017-04-25 ENCOUNTER — LAB ENCOUNTER (OUTPATIENT)
Dept: LAB | Age: 82
End: 2017-04-25
Attending: FAMILY MEDICINE
Payer: MEDICARE

## 2017-04-25 DIAGNOSIS — E11.8 TYPE 2 DIABETES MELLITUS WITH COMPLICATION, WITHOUT LONG-TERM CURRENT USE OF INSULIN (HCC): ICD-10-CM

## 2017-04-25 PROCEDURE — 83036 HEMOGLOBIN GLYCOSYLATED A1C: CPT

## 2017-04-25 PROCEDURE — 36415 COLL VENOUS BLD VENIPUNCTURE: CPT

## 2017-04-25 PROCEDURE — 80061 LIPID PANEL: CPT

## 2017-04-25 PROCEDURE — 80053 COMPREHEN METABOLIC PANEL: CPT

## 2017-04-26 PROBLEM — R07.9 ACUTE CHEST PAIN: Status: RESOLVED | Noted: 2017-02-19 | Resolved: 2017-04-26

## 2017-04-26 PROBLEM — R33.9 URINARY RETENTION: Status: RESOLVED | Noted: 2017-02-19 | Resolved: 2017-04-26

## 2017-04-26 PROBLEM — R33.8 ACUTE RETENTION OF URINE: Status: RESOLVED | Noted: 2017-02-20 | Resolved: 2017-04-26

## 2017-04-26 PROBLEM — N28.9 RENAL INSUFFICIENCY: Status: RESOLVED | Noted: 2017-02-19 | Resolved: 2017-04-26

## 2017-04-28 ENCOUNTER — OFFICE VISIT (OUTPATIENT)
Dept: FAMILY MEDICINE CLINIC | Facility: CLINIC | Age: 82
End: 2017-04-28

## 2017-04-28 VITALS
SYSTOLIC BLOOD PRESSURE: 124 MMHG | DIASTOLIC BLOOD PRESSURE: 80 MMHG | RESPIRATION RATE: 14 BRPM | BODY MASS INDEX: 25 KG/M2 | HEART RATE: 68 BPM | WEIGHT: 159.38 LBS

## 2017-04-28 DIAGNOSIS — I10 ESSENTIAL HYPERTENSION WITH GOAL BLOOD PRESSURE LESS THAN 140/90: Chronic | ICD-10-CM

## 2017-04-28 DIAGNOSIS — J44.9 CHRONIC OBSTRUCTIVE PULMONARY DISEASE, UNSPECIFIED COPD TYPE (HCC): ICD-10-CM

## 2017-04-28 DIAGNOSIS — L82.0 SEBORRHEIC KERATOSIS, INFLAMED: ICD-10-CM

## 2017-04-28 DIAGNOSIS — E11.22 CKD STAGE 4 DUE TO TYPE 2 DIABETES MELLITUS (HCC): ICD-10-CM

## 2017-04-28 DIAGNOSIS — I11.0 HYPERTENSIVE HEART DISEASE WITH DIASTOLIC HEART FAILURE (HCC): Chronic | ICD-10-CM

## 2017-04-28 DIAGNOSIS — N18.4 CKD STAGE 4 DUE TO TYPE 2 DIABETES MELLITUS (HCC): ICD-10-CM

## 2017-04-28 DIAGNOSIS — E78.00 PURE HYPERCHOLESTEROLEMIA: Primary | Chronic | ICD-10-CM

## 2017-04-28 DIAGNOSIS — N40.1 BPH WITH OBSTRUCTION/LOWER URINARY TRACT SYMPTOMS: Chronic | ICD-10-CM

## 2017-04-28 DIAGNOSIS — E11.42 DIABETIC POLYNEUROPATHY ASSOCIATED WITH TYPE 2 DIABETES MELLITUS (HCC): ICD-10-CM

## 2017-04-28 DIAGNOSIS — I50.30 HYPERTENSIVE HEART DISEASE WITH DIASTOLIC HEART FAILURE (HCC): Chronic | ICD-10-CM

## 2017-04-28 DIAGNOSIS — E11.9 CONTROLLED TYPE 2 DIABETES MELLITUS WITHOUT COMPLICATION, WITHOUT LONG-TERM CURRENT USE OF INSULIN (HCC): ICD-10-CM

## 2017-04-28 DIAGNOSIS — N13.8 BPH WITH OBSTRUCTION/LOWER URINARY TRACT SYMPTOMS: Chronic | ICD-10-CM

## 2017-04-28 DIAGNOSIS — I70.0 ATHEROSCLEROSIS OF AORTA (HCC): ICD-10-CM

## 2017-04-28 DIAGNOSIS — I10 ESSENTIAL HYPERTENSION: Chronic | ICD-10-CM

## 2017-04-28 PROBLEM — R10.9 ABDOMINAL PAIN OF UNKNOWN ETIOLOGY: Status: RESOLVED | Noted: 2017-02-19 | Resolved: 2017-04-28

## 2017-04-28 PROCEDURE — 99214 OFFICE O/P EST MOD 30 MIN: CPT | Performed by: FAMILY MEDICINE

## 2017-04-28 RX ORDER — GLYBURIDE 2.5 MG/1
2.5 TABLET ORAL
Qty: 90 TABLET | Refills: 3 | Status: SHIPPED | OUTPATIENT
Start: 2017-04-28 | End: 2017-08-03

## 2017-04-28 RX ORDER — METOPROLOL TARTRATE 100 MG/1
100 TABLET ORAL DAILY
Qty: 90 TABLET | Refills: 3 | Status: SHIPPED | OUTPATIENT
Start: 2017-04-28 | End: 2018-05-17

## 2017-04-28 RX ORDER — ATORVASTATIN CALCIUM 20 MG/1
20 TABLET, FILM COATED ORAL
Qty: 30 TABLET | Refills: 5 | Status: SHIPPED | OUTPATIENT
Start: 2017-04-28 | End: 2017-08-03

## 2017-04-28 NOTE — PROGRESS NOTES
HPI:   Macario Hair is a 80year old male who presents for recheck of his diabetes.     Patient presents with:  HTN: 6 month check  Lab Results: had blood work done  Diabetes  creatine baseline 2014 1.6, now 2.0, no recent renal consult, but Dr Joseph Betancourt Wt Readings from Last 3 Encounters:  04/28/17 : 159 lb 6.4 oz  02/19/17 : 163 lb  10/14/16 : 164 lb    BP Readings from Last 3 Encounters:  04/28/17 : 124/80  02/22/17 : 120/64  02/17/17 : 150/94        HPI     Past History:   He  has a past medical h Review of Systems GENERAL HEALTH: feels well otherwise  SKIN: denies any unusual skin lesions or rashes  RESPIRATORY: denies shortness of breath with exertion  CARDIOVASCULAR: denies chest pain on exertion  GI: denies abdominal pain and denies heartburn ordered     As for his cholesterol, Lipids are well controlled, no significant medication side effects noted.    PLAN: will continue present medications, reviewed diet, exercise and weight control, and labs as ordered        Mr. Sophia Melton already takes aspir present management.               Endocrine    CKD stage 4 due to type 2 diabetes mellitus (HCC)    Overview     Creatinine 2.0, GFR 30, on losartan 50 mg         Current Assessment & Plan       Lab Results  Component Value Date/Time   CREATSERUM 2.13* 04/2

## 2017-04-28 NOTE — ASSESSMENT & PLAN NOTE
Lab Results  Component Value Date/Time   MU 2.13* 04/25/2017 07:49 AM   GFR 27* 04/25/2017 07:49 AM   GFRNAA 39* 05/24/2014 08:05 AM

## 2017-04-29 NOTE — ASSESSMENT & PLAN NOTE
Foot exam done today. Bilateral barefoot skin diabetic exam is normal, visualized feet and the appearance is normal.  Bilateral monofilament/sensation of both feet is normal.  Pulsation pedal pulse exam of both lower legs/feet is normal as well.

## 2017-04-29 NOTE — ASSESSMENT & PLAN NOTE
Stable, Continue present management.     Blood Pressure and Cardiac Medications          Metoprolol Tartrate 100 MG Oral Tab    LOSARTAN POTASSIUM 50 MG Oral Tab    HYDRALAZINE HCL 25 MG Oral Tab

## 2017-04-29 NOTE — ASSESSMENT & PLAN NOTE
Stable, Continue present management.     Cholesterol Lowering Medications          Atorvastatin Calcium 20 MG Oral Tab

## 2017-05-31 DIAGNOSIS — I10 ESSENTIAL HYPERTENSION: Primary | Chronic | ICD-10-CM

## 2017-06-01 RX ORDER — METOPROLOL TARTRATE 100 MG/1
TABLET ORAL
Qty: 90 TABLET | Refills: 1 | Status: SHIPPED | OUTPATIENT
Start: 2017-06-01 | End: 2017-11-23

## 2017-06-02 ENCOUNTER — TELEPHONE (OUTPATIENT)
Dept: FAMILY MEDICINE CLINIC | Facility: CLINIC | Age: 82
End: 2017-06-02

## 2017-06-02 DIAGNOSIS — N18.4 CKD STAGE 4 DUE TO TYPE 2 DIABETES MELLITUS (HCC): Primary | ICD-10-CM

## 2017-06-02 DIAGNOSIS — E11.22 CKD STAGE 4 DUE TO TYPE 2 DIABETES MELLITUS (HCC): Primary | ICD-10-CM

## 2017-06-02 DIAGNOSIS — E11.8 TYPE 2 DIABETES MELLITUS WITH COMPLICATION, WITHOUT LONG-TERM CURRENT USE OF INSULIN (HCC): ICD-10-CM

## 2017-06-02 RX ORDER — LANCETS 21 GAUGE
EACH MISCELLANEOUS
Qty: 1 EACH | Refills: 0 | Status: SHIPPED | OUTPATIENT
Start: 2017-06-02

## 2017-06-02 RX ORDER — LANCETS
EACH MISCELLANEOUS
Qty: 200 EACH | Refills: 3 | Status: SHIPPED | OUTPATIENT
Start: 2017-06-02

## 2017-06-02 NOTE — TELEPHONE ENCOUNTER
New script for lancets and lancing devise for One Touch sent to [facility]. He is a Medicare so he needs [address] with DX code. Call with questions.

## 2017-07-28 DIAGNOSIS — E11.22 TYPE 2 DIABETES MELLITUS WITH STAGE 3 CHRONIC KIDNEY DISEASE, UNSPECIFIED LONG TERM INSULIN USE STATUS: ICD-10-CM

## 2017-07-28 DIAGNOSIS — N18.3 TYPE 2 DIABETES MELLITUS WITH STAGE 3 CHRONIC KIDNEY DISEASE, UNSPECIFIED LONG TERM INSULIN USE STATUS: ICD-10-CM

## 2017-08-03 ENCOUNTER — OFFICE VISIT (OUTPATIENT)
Dept: FAMILY MEDICINE CLINIC | Facility: CLINIC | Age: 82
End: 2017-08-03

## 2017-08-03 VITALS
RESPIRATION RATE: 14 BRPM | SYSTOLIC BLOOD PRESSURE: 122 MMHG | BODY MASS INDEX: 25 KG/M2 | TEMPERATURE: 97 F | HEART RATE: 60 BPM | WEIGHT: 159 LBS | DIASTOLIC BLOOD PRESSURE: 74 MMHG

## 2017-08-03 DIAGNOSIS — N18.4 CKD STAGE 4 DUE TO TYPE 2 DIABETES MELLITUS (HCC): ICD-10-CM

## 2017-08-03 DIAGNOSIS — E11.8 TYPE 2 DIABETES MELLITUS WITH COMPLICATION, WITHOUT LONG-TERM CURRENT USE OF INSULIN (HCC): Primary | ICD-10-CM

## 2017-08-03 DIAGNOSIS — E11.9 CONTROLLED TYPE 2 DIABETES MELLITUS WITHOUT COMPLICATION, WITHOUT LONG-TERM CURRENT USE OF INSULIN (HCC): ICD-10-CM

## 2017-08-03 DIAGNOSIS — I11.0 HYPERTENSIVE HEART DISEASE WITH DIASTOLIC HEART FAILURE (HCC): Chronic | ICD-10-CM

## 2017-08-03 DIAGNOSIS — J44.9 CHRONIC OBSTRUCTIVE PULMONARY DISEASE, UNSPECIFIED COPD TYPE (HCC): ICD-10-CM

## 2017-08-03 DIAGNOSIS — E11.22 CKD STAGE 4 DUE TO TYPE 2 DIABETES MELLITUS (HCC): ICD-10-CM

## 2017-08-03 DIAGNOSIS — E78.00 PURE HYPERCHOLESTEROLEMIA: Chronic | ICD-10-CM

## 2017-08-03 DIAGNOSIS — E11.42 DIABETIC POLYNEUROPATHY ASSOCIATED WITH TYPE 2 DIABETES MELLITUS (HCC): ICD-10-CM

## 2017-08-03 DIAGNOSIS — K21.9 GASTROESOPHAGEAL REFLUX DISEASE WITHOUT ESOPHAGITIS: Chronic | ICD-10-CM

## 2017-08-03 DIAGNOSIS — I10 ESSENTIAL HYPERTENSION: Chronic | ICD-10-CM

## 2017-08-03 DIAGNOSIS — I50.30 HYPERTENSIVE HEART DISEASE WITH DIASTOLIC HEART FAILURE (HCC): Chronic | ICD-10-CM

## 2017-08-03 PROCEDURE — 99214 OFFICE O/P EST MOD 30 MIN: CPT | Performed by: FAMILY MEDICINE

## 2017-08-03 RX ORDER — PANTOPRAZOLE SODIUM 40 MG/1
TABLET, DELAYED RELEASE ORAL
Qty: 90 TABLET | Refills: 3 | Status: SHIPPED | OUTPATIENT
Start: 2017-08-03 | End: 2018-08-30

## 2017-08-03 RX ORDER — GLYBURIDE 2.5 MG/1
2.5 TABLET ORAL
Qty: 90 TABLET | Refills: 3 | Status: SHIPPED | OUTPATIENT
Start: 2017-08-03 | End: 2018-08-09

## 2017-08-03 RX ORDER — ATORVASTATIN CALCIUM 20 MG/1
20 TABLET, FILM COATED ORAL
Qty: 90 TABLET | Refills: 3 | Status: SHIPPED | OUTPATIENT
Start: 2017-08-03 | End: 2017-11-16

## 2017-08-03 NOTE — PROGRESS NOTES
HPI:   Emily Rea is a 80year old male who presents for recheck of his diabetes. Stable CKD4, good BS, not wanting to follow with renal, got Dr Emilee Lua had injections into knee. Swims daily, very active, poor hearing and poor vision.     Patient pr 04/25/2017 07:49 AM   CREATSERUM 2.13 (H) 04/25/2017 07:49 AM   GFR 27 (L) 04/25/2017 07:49 AM         Wt Readings from Last 3 Encounters:  08/03/17 : 159 lb  07/07/17 : 165 lb  04/28/17 : 159 lb 6.4 oz    BP Readings from Last 3 Encounters:  08/03/17 : 12 mouth daily with breakfast.   [DISCONTINUED] Pantoprazole Sodium 40 MG Oral Tab EC TAKE 1 TABLET (40 MG TOTAL) BY MOUTH EVERY MORNING BEFORE BREAKFAST. No current facility-administered medications on file prior to visit.      CBC  (most recent labs) side, and 2+ on the left side. Edema not present. Pulmonary/Chest: Effort normal and breath sounds normal. No respiratory distress. He has no wheezes. Abdominal: Soft. Bowel sounds are normal. There is no tenderness.    Neurological: He is alert and o 8/15 showed EF 55%            Respiratory    COPD (chronic obstructive pulmonary disease) (HCC)    Overview     Not on meds b.o cost (no coverage)            Endocrine    Diabetes mellitus type 2 with complications (RUSTca 75.) - Primary    Overview     Dx in 61'

## 2017-08-10 ENCOUNTER — TELEPHONE (OUTPATIENT)
Dept: FAMILY MEDICINE CLINIC | Facility: CLINIC | Age: 82
End: 2017-08-10

## 2017-08-10 DIAGNOSIS — N18.3 TYPE 2 DIABETES MELLITUS WITH STAGE 3 CHRONIC KIDNEY DISEASE, UNSPECIFIED LONG TERM INSULIN USE STATUS: ICD-10-CM

## 2017-08-10 DIAGNOSIS — E11.22 TYPE 2 DIABETES MELLITUS WITH STAGE 3 CHRONIC KIDNEY DISEASE, UNSPECIFIED LONG TERM INSULIN USE STATUS: ICD-10-CM

## 2017-09-06 ENCOUNTER — TELEPHONE (OUTPATIENT)
Dept: FAMILY MEDICINE CLINIC | Facility: CLINIC | Age: 82
End: 2017-09-06

## 2017-09-06 DIAGNOSIS — E11.22 TYPE 2 DIABETES MELLITUS WITH STAGE 3 CHRONIC KIDNEY DISEASE, UNSPECIFIED LONG TERM INSULIN USE STATUS: ICD-10-CM

## 2017-09-06 DIAGNOSIS — N18.3 TYPE 2 DIABETES MELLITUS WITH STAGE 3 CHRONIC KIDNEY DISEASE, UNSPECIFIED LONG TERM INSULIN USE STATUS: ICD-10-CM

## 2017-09-06 NOTE — TELEPHONE ENCOUNTER
Called and talked to patient's daughter they are testing 3 times a day now and wanted to have new RX sent in I warned her that medicare might not pay for this but she wanted to try.  So new RX sent in pre protocol

## 2017-09-06 NOTE — TELEPHONE ENCOUNTER
Pt came into his pharmacy looking for his test strips and told pharmacist that he now tests 3 x per day. They are looking to verify this information.

## 2017-09-27 RX ORDER — HYDRALAZINE HYDROCHLORIDE 25 MG/1
TABLET, FILM COATED ORAL
Qty: 180 TABLET | Refills: 1 | Status: SHIPPED | OUTPATIENT
Start: 2017-09-27 | End: 2018-03-31

## 2017-10-10 ENCOUNTER — TELEPHONE (OUTPATIENT)
Dept: FAMILY MEDICINE CLINIC | Facility: CLINIC | Age: 82
End: 2017-10-10

## 2017-10-20 ENCOUNTER — NURSE ONLY (OUTPATIENT)
Dept: FAMILY MEDICINE CLINIC | Facility: CLINIC | Age: 82
End: 2017-10-20

## 2017-10-20 PROCEDURE — G0008 ADMIN INFLUENZA VIRUS VAC: HCPCS | Performed by: FAMILY MEDICINE

## 2017-10-20 PROCEDURE — 90653 IIV ADJUVANT VACCINE IM: CPT | Performed by: FAMILY MEDICINE

## 2017-10-20 NOTE — PROGRESS NOTES
Sondra Massey presents with his daughter for Flu vaccine. Fluad given IM in LD per  order. Tolerated well. VIS given.

## 2017-11-09 DIAGNOSIS — E78.00 PURE HYPERCHOLESTEROLEMIA: Chronic | ICD-10-CM

## 2017-11-09 RX ORDER — ATORVASTATIN CALCIUM 20 MG/1
TABLET, FILM COATED ORAL
Qty: 90 TABLET | Refills: 4 | Status: SHIPPED | OUTPATIENT
Start: 2017-11-09 | End: 2018-11-17

## 2017-11-10 ENCOUNTER — TELEPHONE (OUTPATIENT)
Dept: FAMILY MEDICINE CLINIC | Facility: CLINIC | Age: 82
End: 2017-11-10

## 2017-11-10 ENCOUNTER — LABORATORY ENCOUNTER (OUTPATIENT)
Dept: LAB | Age: 82
End: 2017-11-10
Attending: FAMILY MEDICINE
Payer: MEDICARE

## 2017-11-10 DIAGNOSIS — E11.8 TYPE 2 DIABETES MELLITUS WITH COMPLICATION, WITHOUT LONG-TERM CURRENT USE OF INSULIN (HCC): ICD-10-CM

## 2017-11-10 PROCEDURE — 83036 HEMOGLOBIN GLYCOSYLATED A1C: CPT

## 2017-11-10 PROCEDURE — 36415 COLL VENOUS BLD VENIPUNCTURE: CPT

## 2017-11-10 PROCEDURE — 80053 COMPREHEN METABOLIC PANEL: CPT

## 2017-11-10 PROCEDURE — 85025 COMPLETE CBC W/AUTO DIFF WBC: CPT

## 2017-11-14 PROBLEM — Z91.81 AT RISK FOR FALLING: Status: RESOLVED | Noted: 2017-02-19 | Resolved: 2017-11-14

## 2017-11-16 ENCOUNTER — OFFICE VISIT (OUTPATIENT)
Dept: FAMILY MEDICINE CLINIC | Facility: CLINIC | Age: 82
End: 2017-11-16

## 2017-11-16 VITALS
SYSTOLIC BLOOD PRESSURE: 100 MMHG | RESPIRATION RATE: 14 BRPM | HEART RATE: 76 BPM | HEIGHT: 67.5 IN | BODY MASS INDEX: 23.99 KG/M2 | WEIGHT: 154.63 LBS | DIASTOLIC BLOOD PRESSURE: 70 MMHG | TEMPERATURE: 98 F

## 2017-11-16 DIAGNOSIS — Z00.00 ENCOUNTER FOR ANNUAL HEALTH EXAMINATION: ICD-10-CM

## 2017-11-16 DIAGNOSIS — I11.0 HYPERTENSIVE HEART DISEASE WITH DIASTOLIC HEART FAILURE (HCC): Chronic | ICD-10-CM

## 2017-11-16 DIAGNOSIS — J44.9 CHRONIC OBSTRUCTIVE PULMONARY DISEASE, UNSPECIFIED COPD TYPE (HCC): ICD-10-CM

## 2017-11-16 DIAGNOSIS — I70.0 ATHEROSCLEROSIS OF AORTA (HCC): ICD-10-CM

## 2017-11-16 DIAGNOSIS — I10 ESSENTIAL HYPERTENSION: Chronic | ICD-10-CM

## 2017-11-16 DIAGNOSIS — E11.8 TYPE 2 DIABETES MELLITUS WITH COMPLICATION, WITHOUT LONG-TERM CURRENT USE OF INSULIN (HCC): ICD-10-CM

## 2017-11-16 DIAGNOSIS — E78.00 PURE HYPERCHOLESTEROLEMIA: Chronic | ICD-10-CM

## 2017-11-16 DIAGNOSIS — E11.42 DIABETIC POLYNEUROPATHY ASSOCIATED WITH TYPE 2 DIABETES MELLITUS (HCC): ICD-10-CM

## 2017-11-16 DIAGNOSIS — Z00.00 ANNUAL PHYSICAL EXAM: Primary | ICD-10-CM

## 2017-11-16 DIAGNOSIS — N18.4 CKD STAGE 4 DUE TO TYPE 2 DIABETES MELLITUS (HCC): ICD-10-CM

## 2017-11-16 DIAGNOSIS — I50.30 HYPERTENSIVE HEART DISEASE WITH DIASTOLIC HEART FAILURE (HCC): Chronic | ICD-10-CM

## 2017-11-16 DIAGNOSIS — E11.22 CKD STAGE 4 DUE TO TYPE 2 DIABETES MELLITUS (HCC): ICD-10-CM

## 2017-11-16 PROCEDURE — 99214 OFFICE O/P EST MOD 30 MIN: CPT | Performed by: FAMILY MEDICINE

## 2017-11-16 PROCEDURE — G0439 PPPS, SUBSEQ VISIT: HCPCS | Performed by: FAMILY MEDICINE

## 2017-11-16 RX ORDER — LOSARTAN POTASSIUM 50 MG/1
50 TABLET ORAL DAILY
Qty: 90 TABLET | Refills: 3 | Status: SHIPPED | OUTPATIENT
Start: 2017-11-16 | End: 2018-12-28

## 2017-11-16 NOTE — ASSESSMENT & PLAN NOTE
Offered inhaler, sample not available.  May need hospice if COPD and CAD/CHF continue to affect level of functioning

## 2017-11-16 NOTE — PROGRESS NOTES
HPI:   Alice Dyer is a 80year old male who presents for a Medicare Subsequent Annual Wellness visit (Pt already had Initial Annual Wellness). Has DNR and LW on file, no hospital, no LW.   His last annual assessment has been over 1 year: Annual Phy Albuterol Sulfate (PROAIR RESPICLICK) 947 (90 Base) MCG/ACT Inhalation Aerosol Powder, Breath Activated Inhale 2 puffs into the lungs every 4 (four) hours as needed (wheezing).    ATORVASTATIN 20 MG Oral Tab TAKE 1 TABLET BY MOUTH ONE TIME A DAY    HYDRAL REVIEW OF SYSTEMS:   Review of Systems   Constitutional: Negative. Negative for diaphoresis, fatigue and fever. HENT: Negative. Eyes: Positive for visual disturbance (blind). Respiratory: Negative for shortness of breath.     Cardiovascular: Neg There is no deformity. Left foot: There is no deformity. Feet: diabetic foot exam performed    Right Foot:   Protective Sensation: 10 sites tested. 2 sites sensed. Skin Integrity: Negative for ulcer.    Left Foot:   Protective Sensation: 6 and 10 METOPROLOL TARTRATE 100 MG Oral Tab    Metoprolol Tartrate 100 MG Oral Tab                 Relevant Medications    Losartan Potassium 50 MG Oral Tab    Other Relevant Orders    OFFICE/OUTPT VISIT,EST,LEVL IV    Hypertensive heart disease with diastolic hea 11/10/2017   A1C 6.8 (H) 04/25/2017      Blood Sugar Medications          glyBURIDE 2.5 MG Oral Tab            Stable on meds, DTR worried about stopping low dose glyburide as he sneaks candy and cookies nightly         Relevant Orders    CBC WITH [facility]     In the past six months, have you lost more than 10 pounds without trying?: 2 - No    Has your appetite been poor?: No    How does the patient maintain a good energy level?: Stretching; Appropriate Exercise    How would you describe yo Then, refresh your progress note to see your input here.   Cognitive Assessment     What day of the week is this?: Correct    What month is it?: Correct    What year is it?: Correct    Recall \"Ball\": Correct    Recall \"Flag\": Correct    Recall \"Tree\ Orders placed or performed in visit on 10/15/13  -INFLUENZA VIRUS VACCINE, PRESERV FREE, >=1YEARS OF AGE   Orders placed or performed in visit on 10/02/12  -INFLUENZA VIRUS VACCINE, PRESERV FREE, >=1YEARS OF AGE    Update Immunization Activity if appli

## 2017-11-16 NOTE — PATIENT INSTRUCTIONS
Ellyn Moya's SCREENING SCHEDULE   Tests on this list are recommended by your physician but may not be covered, or covered at this frequency, by your insurer. Please check with your insurance carrier before scheduling to verify coverage.     Sandy Garza any previous visit.  Limited to patients who meet one of the following criteria:   • Men who are 73-68 years old and have smoked more than 100 cigarettes in their lifetime   • Anyone with a family history    Colorectal Cancer Screening Covered up to Age 76 10/16/15  -PNEUMOCOCCAL VACC, 13 DEJA IM    Please get once after your 65th birthday    Pneumococcal 23 (Pneumovax)  Covered Once after 65 No orders found for this or any previous visit.  Please get once after your 65th birthday    Hepatitis B for Moderate/H

## 2017-11-16 NOTE — ASSESSMENT & PLAN NOTE
As for his Diabetes, it is well controlled, no significant medication side effects noted. Recommendations are: continue present meds, lose weight by increased dietary compliance and exercise and will check labs as ordered.   Because of fluctuating blood

## 2017-11-16 NOTE — ASSESSMENT & PLAN NOTE
Stable, Continue present management.     Cholesterol Lowering Medications          ATORVASTATIN 20 MG Oral Tab

## 2017-11-16 NOTE — ASSESSMENT & PLAN NOTE
Stable, Continue present management.     Blood Pressure and Cardiac Medications          Losartan Potassium 50 MG Oral Tab    HYDRALAZINE HCL 25 MG Oral Tab    METOPROLOL TARTRATE 100 MG Oral Tab    Metoprolol Tartrate 100 MG Oral Tab

## 2017-11-23 DIAGNOSIS — I10 ESSENTIAL HYPERTENSION: Chronic | ICD-10-CM

## 2017-11-24 RX ORDER — METOPROLOL TARTRATE 100 MG/1
TABLET ORAL
Qty: 90 TABLET | Refills: 1 | Status: SHIPPED | OUTPATIENT
Start: 2017-11-24 | End: 2018-05-17

## 2018-02-13 ENCOUNTER — TELEPHONE (OUTPATIENT)
Dept: FAMILY MEDICINE CLINIC | Facility: CLINIC | Age: 83
End: 2018-02-13

## 2018-02-13 NOTE — TELEPHONE ENCOUNTER
Patient's daughter dropped off form for Placard/License plate renewal, would like completed and then mailed to  in self-addressed envelope provided.  Form in triage

## 2018-02-14 ENCOUNTER — TELEPHONE (OUTPATIENT)
Dept: FAMILY MEDICINE CLINIC | Facility: CLINIC | Age: 83
End: 2018-02-14

## 2018-02-14 NOTE — TELEPHONE ENCOUNTER
[facility] sent fax stating Medicare is initiating a Audit on this [atient in regards to his Diabetes and supplies ordered.  They need Progress notes of office visit prior to 12/12/17 that should include dictation about diabetes testing frequency, supplie

## 2018-02-14 NOTE — TELEPHONE ENCOUNTER
Chart addendum to say 3 times daily blood sugar testing because of his hypoglycemia episodes.   Okay to forward note from [phone]

## 2018-02-26 DIAGNOSIS — E11.22 TYPE 2 DIABETES MELLITUS WITH STAGE 3 CHRONIC KIDNEY DISEASE, UNSPECIFIED LONG TERM INSULIN USE STATUS: ICD-10-CM

## 2018-02-26 DIAGNOSIS — N18.3 TYPE 2 DIABETES MELLITUS WITH STAGE 3 CHRONIC KIDNEY DISEASE, UNSPECIFIED LONG TERM INSULIN USE STATUS: ICD-10-CM

## 2018-04-02 RX ORDER — HYDRALAZINE HYDROCHLORIDE 25 MG/1
TABLET, FILM COATED ORAL
Qty: 180 TABLET | Refills: 1 | Status: SHIPPED | OUTPATIENT
Start: 2018-04-02 | End: 2018-09-30

## 2018-04-02 NOTE — TELEPHONE ENCOUNTER
Incoming request for Hydralazine. LOV 11/16/2017 and last labs done 11/10/2017. Future Appointments  Date Time Provider Estefani Sheehan   5/17/2018 10:00 AM Clem Sarmiento MD [facility]     Medication passed protocol. Medication sent.

## 2018-05-17 ENCOUNTER — OFFICE VISIT (OUTPATIENT)
Dept: FAMILY MEDICINE CLINIC | Facility: CLINIC | Age: 83
End: 2018-05-17

## 2018-05-17 VITALS
TEMPERATURE: 98 F | RESPIRATION RATE: 14 BRPM | DIASTOLIC BLOOD PRESSURE: 80 MMHG | HEIGHT: 64 IN | SYSTOLIC BLOOD PRESSURE: 110 MMHG | WEIGHT: 154.38 LBS | HEART RATE: 72 BPM | BODY MASS INDEX: 26.36 KG/M2

## 2018-05-17 DIAGNOSIS — N18.4 CKD STAGE 4 DUE TO TYPE 2 DIABETES MELLITUS (HCC): ICD-10-CM

## 2018-05-17 DIAGNOSIS — I10 ESSENTIAL HYPERTENSION: Primary | ICD-10-CM

## 2018-05-17 DIAGNOSIS — E11.22 CKD STAGE 4 DUE TO TYPE 2 DIABETES MELLITUS (HCC): ICD-10-CM

## 2018-05-17 DIAGNOSIS — E11.8 TYPE 2 DIABETES MELLITUS WITH COMPLICATION, WITHOUT LONG-TERM CURRENT USE OF INSULIN (HCC): ICD-10-CM

## 2018-05-17 DIAGNOSIS — I11.0 HYPERTENSIVE HEART DISEASE WITH DIASTOLIC HEART FAILURE (HCC): ICD-10-CM

## 2018-05-17 DIAGNOSIS — E78.00 PURE HYPERCHOLESTEROLEMIA: ICD-10-CM

## 2018-05-17 DIAGNOSIS — I50.30 HYPERTENSIVE HEART DISEASE WITH DIASTOLIC HEART FAILURE (HCC): ICD-10-CM

## 2018-05-17 DIAGNOSIS — J44.9 CHRONIC OBSTRUCTIVE PULMONARY DISEASE, UNSPECIFIED COPD TYPE (HCC): ICD-10-CM

## 2018-05-17 DIAGNOSIS — I10 ESSENTIAL HYPERTENSION WITH GOAL BLOOD PRESSURE LESS THAN 140/90: Chronic | ICD-10-CM

## 2018-05-17 DIAGNOSIS — E11.42 DIABETIC POLYNEUROPATHY ASSOCIATED WITH TYPE 2 DIABETES MELLITUS (HCC): ICD-10-CM

## 2018-05-17 PROCEDURE — 99214 OFFICE O/P EST MOD 30 MIN: CPT | Performed by: FAMILY MEDICINE

## 2018-05-17 RX ORDER — METOPROLOL TARTRATE 100 MG/1
100 TABLET ORAL DAILY
Qty: 90 TABLET | Refills: 3 | Status: SHIPPED | OUTPATIENT
Start: 2018-05-17 | End: 2019-05-24

## 2018-05-17 NOTE — PROGRESS NOTES
HPI:   Eden Mendez is a 80year old male who presents for recheck of his diabetes.   A1c 6.3%  Doing well overall, no new issues  Patient presents with:  Diabetes: wasn't able to do lab yet    There are no preventive care reminders to display for this 10:05 AM:  Chol:HDL ratio View Report for additional information:  4.36 4/25/2017:  1yr ago      Last refreshed: 5/17/2018 10:05 AM:  Lipid panel service date 4/25/2017       Last refreshed: 5/17/2018 10:05 AM:  TSH View Report for additional information: AM   TSH 3.820 08/30/2015 04:15 PM   BUN 23 (H) 11/10/2017 08:37 AM   CREATSERUM 1.88 (H) 11/10/2017 08:37 AM   GFR 31 (L) 11/10/2017 08:37 AM         Wt Readings from Last 3 Encounters:  05/17/18 : 154 lb 6.4 oz  11/16/17 : 154 lb 9.6 oz  08/03/17 : 159 l FORMULARY Take 1 tablet by mouth 2 (two) times daily. NON FORMULARY Take 1 tablet by mouth 2 (two) times daily. aspirin 325 MG Oral Tab EC Take 1 tablet by mouth daily.    [DISCONTINUED] METOPROLOL TARTRATE 100 MG Oral Tab TAKE 1 TABLET (100 MG TOTAL) B no tenderness. Musculoskeletal:        Right foot: There is no deformity. Left foot: There is no deformity. Feet: diabetic foot exam performed    Right Foot:   Protective Sensation: 6 sites tested. 6 sites sensed.    Skin Integrity: Negative for Current Assessment & Plan     Stable, Continue present management.            Relevant Medications    Metoprolol Tartrate 100 MG Oral Tab       Respiratory    COPD (chronic obstructive pulmonary disease) (HCC)    Overview     Not on meds b.o cost (no cov

## 2018-05-18 NOTE — ASSESSMENT & PLAN NOTE
Lab Results  Component Value Date   A1C 6.3 (H) 11/10/2017   A1C 6.8 (H) 04/25/2017      Blood Sugar Medications          glyBURIDE 2.5 MG Oral Tab

## 2018-06-06 ENCOUNTER — TELEPHONE (OUTPATIENT)
Dept: FAMILY MEDICINE CLINIC | Facility: CLINIC | Age: 83
End: 2018-06-06

## 2018-06-06 NOTE — TELEPHONE ENCOUNTER
No reason to get the x-rays unless he is unable to bear weight.   We can see how he does over the next 24-48 hours and if he is not doing better by Friday morning I can send him for x-ray without having him seen

## 2018-06-06 NOTE — TELEPHONE ENCOUNTER
Patient fell yesterday, bruised knee and ankle, no swelling, patient refuses to go to hospital. Daughter is going through chemo and unable to bring him in to office but requesting an xray for right knee and right ankle

## 2018-06-07 NOTE — TELEPHONE ENCOUNTER
Spoke with daughter, Lashanda Castillo. Miracle Rowan  is getting up to the Bathroom and able to bear weight. The knee gives out at times but that has happened before the fall, although not as often. He is icing and elevating and using a knee and ankle brace when up.   He

## 2018-06-08 NOTE — TELEPHONE ENCOUNTER
Coni Spencer called back patient is weight bearing and doing well now they are going to see [facility] for the knee injections but everything is OK for now

## 2018-07-02 PROBLEM — M17.11 PRIMARY OSTEOARTHRITIS OF RIGHT KNEE: Status: ACTIVE | Noted: 2018-07-02

## 2018-08-09 DIAGNOSIS — E11.8 TYPE 2 DIABETES MELLITUS WITH COMPLICATION, WITHOUT LONG-TERM CURRENT USE OF INSULIN (HCC): ICD-10-CM

## 2018-08-09 RX ORDER — GLYBURIDE 2.5 MG/1
2.5 TABLET ORAL
Qty: 90 TABLET | Refills: 3 | Status: SHIPPED | OUTPATIENT
Start: 2018-08-09 | End: 2019-08-06

## 2018-08-09 NOTE — TELEPHONE ENCOUNTER
Refill request for Glyburide fails protocol because last A1C was over 6 months ago. A1C was 6.3 on 11/10/17. LOV 5/17/18.  Future Appointments  Date Time Provider Estefani Sheehan   8/20/2018 8:20 AM Moe Cervantes MD [facility] NP JOVAN Tucker   11/23

## 2018-08-30 DIAGNOSIS — K21.9 GASTROESOPHAGEAL REFLUX DISEASE WITHOUT ESOPHAGITIS: Chronic | ICD-10-CM

## 2018-08-31 DIAGNOSIS — E11.22 TYPE 2 DIABETES MELLITUS WITH STAGE 3 CHRONIC KIDNEY DISEASE (HCC): ICD-10-CM

## 2018-08-31 DIAGNOSIS — N18.30 TYPE 2 DIABETES MELLITUS WITH STAGE 3 CHRONIC KIDNEY DISEASE (HCC): ICD-10-CM

## 2018-08-31 RX ORDER — PANTOPRAZOLE SODIUM 40 MG/1
TABLET, DELAYED RELEASE ORAL
Qty: 90 TABLET | Refills: 2 | Status: SHIPPED | OUTPATIENT
Start: 2018-08-31 | End: 2019-04-03

## 2018-08-31 NOTE — TELEPHONE ENCOUNTER
LOV 5/17/2018     Future Appointments  Date Time Provider Estefani Sheehan   11/23/2018 9:30 AM Nick Becerra MD [facility]        Refill request for:      Pending Prescriptions Disp Refills    PANTOPRAZOLE SODIUM 40 MG Oral Tab EC [Pharmacy Med Na

## 2018-09-17 ENCOUNTER — PRIOR ORIGINAL RECORDS (OUTPATIENT)
Dept: OTHER | Age: 83
End: 2018-09-17

## 2018-09-17 ENCOUNTER — MYAURORA ACCOUNT LINK (OUTPATIENT)
Dept: OTHER | Age: 83
End: 2018-09-17

## 2018-09-30 DIAGNOSIS — I50.30 HYPERTENSIVE HEART DISEASE WITH DIASTOLIC HEART FAILURE (HCC): Chronic | ICD-10-CM

## 2018-09-30 DIAGNOSIS — I11.0 HYPERTENSIVE HEART DISEASE WITH DIASTOLIC HEART FAILURE (HCC): Chronic | ICD-10-CM

## 2018-09-30 DIAGNOSIS — E78.00 PURE HYPERCHOLESTEROLEMIA: Primary | Chronic | ICD-10-CM

## 2018-10-01 RX ORDER — HYDRALAZINE HYDROCHLORIDE 25 MG/1
TABLET, FILM COATED ORAL
Qty: 180 TABLET | Refills: 1 | Status: SHIPPED | OUTPATIENT
Start: 2018-10-01 | End: 2019-04-03

## 2018-10-13 ENCOUNTER — IMMUNIZATION (OUTPATIENT)
Dept: FAMILY MEDICINE CLINIC | Facility: CLINIC | Age: 83
End: 2018-10-13
Payer: MEDICARE

## 2018-10-13 PROCEDURE — 90653 IIV ADJUVANT VACCINE IM: CPT | Performed by: FAMILY MEDICINE

## 2018-10-13 PROCEDURE — G0008 ADMIN INFLUENZA VIRUS VAC: HCPCS | Performed by: FAMILY MEDICINE

## 2018-11-15 ENCOUNTER — LABORATORY ENCOUNTER (OUTPATIENT)
Dept: LAB | Age: 83
End: 2018-11-15
Attending: FAMILY MEDICINE
Payer: MEDICARE

## 2018-11-15 DIAGNOSIS — E11.22 CKD STAGE 4 DUE TO TYPE 2 DIABETES MELLITUS (HCC): ICD-10-CM

## 2018-11-15 DIAGNOSIS — N18.4 CKD STAGE 4 DUE TO TYPE 2 DIABETES MELLITUS (HCC): ICD-10-CM

## 2018-11-15 DIAGNOSIS — E11.8 TYPE 2 DIABETES MELLITUS WITH COMPLICATION, WITHOUT LONG-TERM CURRENT USE OF INSULIN (HCC): ICD-10-CM

## 2018-11-15 PROCEDURE — 80061 LIPID PANEL: CPT

## 2018-11-15 PROCEDURE — 83036 HEMOGLOBIN GLYCOSYLATED A1C: CPT

## 2018-11-15 PROCEDURE — 80053 COMPREHEN METABOLIC PANEL: CPT

## 2018-11-15 PROCEDURE — 36415 COLL VENOUS BLD VENIPUNCTURE: CPT

## 2018-11-15 PROCEDURE — 85025 COMPLETE CBC W/AUTO DIFF WBC: CPT

## 2018-11-17 DIAGNOSIS — E78.00 PURE HYPERCHOLESTEROLEMIA: Chronic | ICD-10-CM

## 2018-11-20 RX ORDER — ATORVASTATIN CALCIUM 20 MG/1
TABLET, FILM COATED ORAL
Qty: 90 TABLET | Refills: 3 | Status: SHIPPED | OUTPATIENT
Start: 2018-11-20 | End: 2019-11-15

## 2018-11-23 ENCOUNTER — OFFICE VISIT (OUTPATIENT)
Dept: FAMILY MEDICINE CLINIC | Facility: CLINIC | Age: 83
End: 2018-11-23
Payer: MEDICARE

## 2018-11-23 VITALS
BODY MASS INDEX: 27 KG/M2 | RESPIRATION RATE: 16 BRPM | SYSTOLIC BLOOD PRESSURE: 126 MMHG | TEMPERATURE: 98 F | DIASTOLIC BLOOD PRESSURE: 68 MMHG | HEART RATE: 76 BPM | WEIGHT: 155 LBS

## 2018-11-23 DIAGNOSIS — I10 ESSENTIAL HYPERTENSION: Chronic | ICD-10-CM

## 2018-11-23 DIAGNOSIS — J44.9 CHRONIC OBSTRUCTIVE PULMONARY DISEASE, UNSPECIFIED COPD TYPE (HCC): ICD-10-CM

## 2018-11-23 DIAGNOSIS — Z00.00 ENCOUNTER FOR ANNUAL HEALTH EXAMINATION: ICD-10-CM

## 2018-11-23 DIAGNOSIS — E78.00 PURE HYPERCHOLESTEROLEMIA: Chronic | ICD-10-CM

## 2018-11-23 DIAGNOSIS — I50.30 HYPERTENSIVE HEART DISEASE WITH DIASTOLIC HEART FAILURE (HCC): Chronic | ICD-10-CM

## 2018-11-23 DIAGNOSIS — I70.0 ATHEROSCLEROSIS OF AORTA (HCC): ICD-10-CM

## 2018-11-23 DIAGNOSIS — I11.0 HYPERTENSIVE HEART DISEASE WITH DIASTOLIC HEART FAILURE (HCC): Chronic | ICD-10-CM

## 2018-11-23 DIAGNOSIS — E11.22 CKD STAGE 4 DUE TO TYPE 2 DIABETES MELLITUS (HCC): ICD-10-CM

## 2018-11-23 DIAGNOSIS — N18.4 CKD STAGE 4 DUE TO TYPE 2 DIABETES MELLITUS (HCC): ICD-10-CM

## 2018-11-23 DIAGNOSIS — Z00.00 ANNUAL PHYSICAL EXAM: Primary | ICD-10-CM

## 2018-11-23 DIAGNOSIS — E11.8 TYPE 2 DIABETES MELLITUS WITH COMPLICATION, WITHOUT LONG-TERM CURRENT USE OF INSULIN (HCC): ICD-10-CM

## 2018-11-23 PROCEDURE — G0439 PPPS, SUBSEQ VISIT: HCPCS | Performed by: FAMILY MEDICINE

## 2018-11-23 PROCEDURE — 99213 OFFICE O/P EST LOW 20 MIN: CPT | Performed by: FAMILY MEDICINE

## 2018-11-23 NOTE — PROGRESS NOTES
HPI:   Teodora Sawyer is a 80year old male who presents for a Medicare Subsequent Annual Wellness visit (Pt already had Initial Annual Wellness). Doing well, fully blind, no new issues. Full cognition.   His last annual assessment has been over 1 year Feeling down, depressed, or hopeless (over the last two weeks)?: Not at all  PHQ-2 SCORE: 0     Advanced Directive: He has a Living Will on file in [address] [place] [address] Rd. He does have a POA but we do NOT have it on file in [address] [place] [address] Rd.    Advance care planning including the exp TRIG 215 (H) 11/15/2018          Last Chemistry Labs:   Lab Results   Component Value Date    AST 16 11/15/2018    ALT 14 (L) 11/15/2018    CA 9.0 11/15/2018    ALB 3.3 11/15/2018    TSH 3.820 08/30/2015    CREATSERUM 1.99 (H) 11/15/2018    GLU 91 11/15/2 He  has a past surgical history that includes appendectomy; cataract; other surgical history (1/2006); other surgical history; CYSTOSCOPY TRANSURETHRAL RESECTION BLADDER TUMOR (N/A, 2/21/2017); and CYSTOSCOPY TRANSURETHRAL RESECTION PROSTATE (N/A, 3/3/2014 Cardiovascular: Normal rate, regular rhythm, S1 normal, S2 normal, normal heart sounds and intact distal pulses. Exam reveals no gallop, no S3, no S4 and no friction rub. No murmur heard. Edema not present. Carotid bruit not present.   Pulmonary/Chest: Sadia Ellington is a 80year old male who presents for a Medicare Assessment. PLAN SUMMARY:        Diet assessment: good     PLAN:  The patient indicates understanding of these issues and agrees to the plan.   Problem List Items Addressed This Visit Current Assessment & Plan     As for his Diabetes, it is well controlled, no significant medication side effects noted.      Recommendations are: continue present meds, lose weight by increased dietary compliance and exercise and will check labs as ordered Fasting Blood Sugar (FSB)Annually Glucose (mg/dL)   Date Value   11/15/2018 91     GLUCOSE (mg/dL)   Date Value   05/24/2014 114 (H)          Cardiovascular Disease Screening     LDL Annually LDL CHOLESTROL (mg/dL)   Date Value   05/24/2014 89     LDL Cho Only covered with a cut with metal- TD and TDaP Not covered by Medicare Part B) No vaccine history found This may be covered with your prescription benefits, but Medicare does not cover unless Medically needed    Zoster   Not covered by Medicare Part B No

## 2018-11-23 NOTE — ASSESSMENT & PLAN NOTE
Stable, Continue present management.     Blood Pressure and Cardiac Medications          HYDRALAZINE HCL 25 MG Oral Tab    Metoprolol Tartrate 100 MG Oral Tab    Losartan Potassium 50 MG Oral Tab

## 2018-11-23 NOTE — PATIENT INSTRUCTIONS
Vonnie Moya's SCREENING SCHEDULE   Tests on this list are recommended by your physician but may not be covered, or covered at this frequency, by your insurer. Please check with your insurance carrier before scheduling to verify coverage.     [facility] [employer] to patients who meet one of the following criteria:   • Men who are 73-68 years old and have smoked more than 100 cigarettes in their lifetime   • Anyone with a family history    Colorectal Cancer Screening Covered up to Age 76     Colonoscopy Scre PNEUMOCOCCAL VACC, 13 DEJA IM    Please get once after your 65th birthday    Pneumococcal 23 (Pneumovax)  Covered Once after 65 No orders found for this or any previous visit.  Please get once after your 65th birthday    Hepatitis B for Moderate/High Risk No

## 2018-12-13 ENCOUNTER — OFFICE VISIT (OUTPATIENT)
Dept: FAMILY MEDICINE CLINIC | Facility: CLINIC | Age: 83
End: 2018-12-13
Payer: MEDICARE

## 2018-12-13 VITALS
DIASTOLIC BLOOD PRESSURE: 80 MMHG | SYSTOLIC BLOOD PRESSURE: 120 MMHG | HEART RATE: 60 BPM | RESPIRATION RATE: 16 BRPM | TEMPERATURE: 97 F

## 2018-12-13 DIAGNOSIS — L82.0 INFLAMED SEBORRHEIC KERATOSIS: ICD-10-CM

## 2018-12-13 DIAGNOSIS — H16.132 ACTINIC KERATITIS OF LEFT EYE: ICD-10-CM

## 2018-12-13 DIAGNOSIS — D22.9 CHANGE IN MOLE: Primary | ICD-10-CM

## 2018-12-13 PROCEDURE — 88305 TISSUE EXAM BY PATHOLOGIST: CPT | Performed by: FAMILY MEDICINE

## 2018-12-13 PROCEDURE — 17110 DESTRUCTION B9 LES UP TO 14: CPT | Performed by: FAMILY MEDICINE

## 2018-12-13 PROCEDURE — 11312 SHAVE SKIN LESION 1.1-2.0 CM: CPT | Performed by: FAMILY MEDICINE

## 2018-12-14 NOTE — PROGRESS NOTES
Patient presents with:  Growth  Eye Problem: Left eye started itching last night      Subjective   HPI:   This is a 80year old male coming in for lesion behinf left ear and on left outter ear.  Lesion is 1 x 2 cm and bleeds easily, likely due to aspirin The skin was prepped in a sterile manor. 1 ml of 1% lido w/o epinephrine was used to numb skin. A shave biopsy was preformed. The lesion was sent to Pathology. Monsel's solution used for hemostasis.     No complications occurred, and there is no need for fo

## 2018-12-28 DIAGNOSIS — I10 ESSENTIAL HYPERTENSION: Chronic | ICD-10-CM

## 2018-12-28 RX ORDER — LOSARTAN POTASSIUM 50 MG/1
50 TABLET ORAL DAILY
Qty: 90 TABLET | Refills: 1 | Status: SHIPPED | OUTPATIENT
Start: 2018-12-28 | End: 2019-05-24

## 2018-12-28 NOTE — TELEPHONE ENCOUNTER
Medication refilled per protocol. Requested Prescriptions     Signed Prescriptions Disp Refills   • LOSARTAN POTASSIUM 50 MG Oral Tab 90 tablet 1     Sig: TAKE 1 TABLET (50 MG TOTAL) BY MOUTH DAILY.      Authorizing Provider: Mor Lopez U

## 2019-01-20 ENCOUNTER — MOBILE ENCOUNTER (OUTPATIENT)
Dept: FAMILY MEDICINE CLINIC | Facility: CLINIC | Age: 84
End: 2019-01-20

## 2019-01-20 NOTE — PROGRESS NOTES
Daughter called stating that everybody in the household has a stomach flu and this morning he woke up at 1 AM vomiting until 4 AM.  He has diabetes and his blood sugars been elevated.   He has not been drinking that much but does not appear to be severely d

## 2019-02-28 VITALS
SYSTOLIC BLOOD PRESSURE: 130 MMHG | WEIGHT: 165 LBS | BODY MASS INDEX: 26.52 KG/M2 | HEIGHT: 66 IN | DIASTOLIC BLOOD PRESSURE: 68 MMHG | HEART RATE: 70 BPM

## 2019-03-12 ENCOUNTER — TELEPHONE (OUTPATIENT)
Dept: FAMILY MEDICINE CLINIC | Facility: CLINIC | Age: 84
End: 2019-03-12

## 2019-03-15 ENCOUNTER — OFFICE VISIT (OUTPATIENT)
Dept: FAMILY MEDICINE CLINIC | Facility: CLINIC | Age: 84
End: 2019-03-15
Payer: MEDICARE

## 2019-03-15 VITALS
BODY MASS INDEX: 26 KG/M2 | TEMPERATURE: 98 F | DIASTOLIC BLOOD PRESSURE: 62 MMHG | HEART RATE: 76 BPM | WEIGHT: 150 LBS | SYSTOLIC BLOOD PRESSURE: 120 MMHG

## 2019-03-15 DIAGNOSIS — R05.9 COUGH: ICD-10-CM

## 2019-03-15 DIAGNOSIS — K52.9 GASTROENTERITIS: ICD-10-CM

## 2019-03-15 DIAGNOSIS — R10.32 LLQ ABDOMINAL PAIN: Primary | ICD-10-CM

## 2019-03-15 PROCEDURE — 99213 OFFICE O/P EST LOW 20 MIN: CPT | Performed by: FAMILY MEDICINE

## 2019-03-15 NOTE — PROGRESS NOTES
Patient presents with:  Abdominal Pain: left lower side       Subjective   HPI:   This is a 80year old male coming in for rib pain, diarrhea last weekend after bday celebration. Now coughing and some gas and pain in left lower side.      HPI   See reviewed continue to follow closely    Gastroenteritis is probably distending it further causing more pain and the cough is making it more painful as well but does not appear to be an internal derangement of the abdomen    Return if symptoms worsen or fail to impro

## 2019-03-15 NOTE — PATIENT INSTRUCTIONS
Moist heat with Gel Packs/ Bean Bag packs warmed up in Microwave and placed over area for 15-20 minutes per hour as needed. Continue antiinflammatories and muscle relaxants and start exercises.

## 2019-04-03 DIAGNOSIS — I50.30 HYPERTENSIVE HEART DISEASE WITH DIASTOLIC HEART FAILURE (HCC): Chronic | ICD-10-CM

## 2019-04-03 DIAGNOSIS — K21.9 GASTROESOPHAGEAL REFLUX DISEASE WITHOUT ESOPHAGITIS: Chronic | ICD-10-CM

## 2019-04-03 DIAGNOSIS — I11.0 HYPERTENSIVE HEART DISEASE WITH DIASTOLIC HEART FAILURE (HCC): Chronic | ICD-10-CM

## 2019-04-03 RX ORDER — HYDRALAZINE HYDROCHLORIDE 25 MG/1
TABLET, FILM COATED ORAL
Qty: 180 TABLET | Refills: 1 | Status: SHIPPED | OUTPATIENT
Start: 2019-04-03 | End: 2019-09-29

## 2019-04-03 RX ORDER — PANTOPRAZOLE SODIUM 40 MG/1
TABLET, DELAYED RELEASE ORAL
Qty: 90 TABLET | Refills: 4 | Status: SHIPPED | OUTPATIENT
Start: 2019-04-03 | End: 2020-05-22

## 2019-04-15 RX ORDER — ATORVASTATIN CALCIUM 20 MG/1
TABLET, FILM COATED ORAL
COMMUNITY
Start: 2014-08-28 | End: 2019-10-14 | Stop reason: SDUPTHER

## 2019-04-15 RX ORDER — HYDRALAZINE HYDROCHLORIDE 25 MG/1
TABLET, FILM COATED ORAL
COMMUNITY
Start: 2015-09-11 | End: 2019-10-14 | Stop reason: SDUPTHER

## 2019-04-15 RX ORDER — METOPROLOL TARTRATE 100 MG/1
TABLET ORAL
COMMUNITY
Start: 2014-08-28 | End: 2019-10-14 | Stop reason: SDUPTHER

## 2019-04-15 RX ORDER — PANTOPRAZOLE SODIUM 40 MG/1
TABLET, DELAYED RELEASE ORAL
COMMUNITY
Start: 2014-08-28

## 2019-04-15 RX ORDER — LOSARTAN POTASSIUM 50 MG/1
TABLET ORAL
COMMUNITY
Start: 2014-08-28 | End: 2019-10-14 | Stop reason: SDUPTHER

## 2019-04-15 RX ORDER — GLYBURIDE 1.25 MG/1
TABLET ORAL
COMMUNITY
Start: 2015-09-11

## 2019-04-15 RX ORDER — ASPIRIN 325 MG
TABLET ORAL
COMMUNITY
Start: 2015-09-11

## 2019-05-02 ENCOUNTER — TELEPHONE (OUTPATIENT)
Dept: FAMILY MEDICINE CLINIC | Facility: CLINIC | Age: 84
End: 2019-05-02

## 2019-05-02 NOTE — TELEPHONE ENCOUNTER
She called patient feels his throat is itchy lungs clear denies fever has some PND no nasal drainage suggested she try coricidin HBP for now and if he gets worse she should call back

## 2019-05-06 ENCOUNTER — TELEPHONE (OUTPATIENT)
Dept: FAMILY MEDICINE CLINIC | Facility: CLINIC | Age: 84
End: 2019-05-06

## 2019-05-06 NOTE — TELEPHONE ENCOUNTER
Received call from Pito Whitt states patient still has lingering cough wants to know if anything else can give him

## 2019-05-06 NOTE — TELEPHONE ENCOUNTER
Called and talked to daughter he is better with coricedin HBP but still has cough that is productive wondering if this is OK or should he use an OTC cough suppressant.

## 2019-05-06 NOTE — TELEPHONE ENCOUNTER
Aleida Portillo for robitussin or mucinex forms of guaifenesin.  Ok for Tea with honey, or hot chocolate as cough suppression

## 2019-05-12 ENCOUNTER — HOSPITAL ENCOUNTER (OUTPATIENT)
Age: 84
Discharge: HOME | End: 2019-05-12
Attending: FAMILY MEDICINE
Payer: MEDICARE

## 2019-05-12 ENCOUNTER — APPOINTMENT (OUTPATIENT)
Dept: GENERAL RADIOLOGY | Age: 84
End: 2019-05-12
Attending: FAMILY MEDICINE
Payer: MEDICARE

## 2019-05-12 VITALS
OXYGEN SATURATION: 98 % | RESPIRATION RATE: 19 BRPM | DIASTOLIC BLOOD PRESSURE: 69 MMHG | SYSTOLIC BLOOD PRESSURE: 162 MMHG | HEART RATE: 70 BPM | TEMPERATURE: 98 F

## 2019-05-12 DIAGNOSIS — J20.9 ACUTE BRONCHITIS, UNSPECIFIED ORGANISM: ICD-10-CM

## 2019-05-12 DIAGNOSIS — J01.90 ACUTE SINUSITIS, RECURRENCE NOT SPECIFIED, UNSPECIFIED LOCATION: Primary | ICD-10-CM

## 2019-05-12 PROCEDURE — 71046 X-RAY EXAM CHEST 2 VIEWS: CPT | Performed by: FAMILY MEDICINE

## 2019-05-12 PROCEDURE — 99204 OFFICE O/P NEW MOD 45 MIN: CPT

## 2019-05-12 PROCEDURE — 99213 OFFICE O/P EST LOW 20 MIN: CPT

## 2019-05-12 RX ORDER — BENZONATATE 100 MG/1
100 CAPSULE ORAL 3 TIMES DAILY PRN
Qty: 21 CAPSULE | Refills: 0 | Status: SHIPPED | OUTPATIENT
Start: 2019-05-12 | End: 2019-05-19

## 2019-05-12 RX ORDER — AZELASTINE 1 MG/ML
SPRAY, METERED NASAL 2 TIMES DAILY
Status: ON HOLD | COMMUNITY
End: 2021-01-01

## 2019-05-12 RX ORDER — AMOXICILLIN AND CLAVULANATE POTASSIUM 875; 125 MG/1; MG/1
1 TABLET, FILM COATED ORAL 2 TIMES DAILY
Qty: 20 TABLET | Refills: 0 | Status: SHIPPED | OUTPATIENT
Start: 2019-05-12 | End: 2019-05-22

## 2019-05-12 NOTE — ED PROVIDER NOTES
Patient Seen in: [address] [facility] In [facility]    History   Patient presents with:  Cough/URI  Dyspnea PRANAY SOB (respiratory)    Stated Complaint: shortness of breath/coughing x 2 wks    HPI    20-year-old male brought in by daughter complains of s Exam     ED Triage Vitals [05/12/19 1444]   BP (!) 180/81   Pulse 70   Resp 18   Temp 98 °F (36.7 °C)   Temp src Oral   SpO2 100 %   O2 Device None (Room air)       Current:BP (!) 162/69   Pulse 70   Temp 98 °F (36.7 °C) (Oral)   Resp 19   SpO2 98% is in no acute distress. Saturating 97% on room air, not tachycardic. Prescribed Augmentin, Tessalon Perles for now. Monitor symptoms and follow PCP accordingly. If has any worsening symptoms, new symptoms recommend follow-up earlier go to the ER.

## 2019-05-12 NOTE — ED INITIAL ASSESSMENT (HPI)
Patient presents with cc of productive cough x 2 weeks. No fever noted. Admits to shortness of breath when he has bronchospasm.

## 2019-05-21 ENCOUNTER — LAB ENCOUNTER (OUTPATIENT)
Dept: LAB | Age: 84
End: 2019-05-21
Attending: FAMILY MEDICINE
Payer: MEDICARE

## 2019-05-21 DIAGNOSIS — E11.8 TYPE 2 DIABETES MELLITUS WITH COMPLICATION, WITHOUT LONG-TERM CURRENT USE OF INSULIN (HCC): ICD-10-CM

## 2019-05-21 DIAGNOSIS — E11.22 CKD STAGE 4 DUE TO TYPE 2 DIABETES MELLITUS (HCC): ICD-10-CM

## 2019-05-21 DIAGNOSIS — N18.4 CKD STAGE 4 DUE TO TYPE 2 DIABETES MELLITUS (HCC): ICD-10-CM

## 2019-05-21 PROCEDURE — 80061 LIPID PANEL: CPT

## 2019-05-21 PROCEDURE — 80053 COMPREHEN METABOLIC PANEL: CPT

## 2019-05-21 PROCEDURE — 85025 COMPLETE CBC W/AUTO DIFF WBC: CPT

## 2019-05-21 PROCEDURE — 83036 HEMOGLOBIN GLYCOSYLATED A1C: CPT

## 2019-05-21 PROCEDURE — 36415 COLL VENOUS BLD VENIPUNCTURE: CPT

## 2019-05-24 ENCOUNTER — OFFICE VISIT (OUTPATIENT)
Dept: FAMILY MEDICINE CLINIC | Facility: CLINIC | Age: 84
End: 2019-05-24
Payer: MEDICARE

## 2019-05-24 VITALS
SYSTOLIC BLOOD PRESSURE: 102 MMHG | BODY MASS INDEX: 26 KG/M2 | RESPIRATION RATE: 15 BRPM | DIASTOLIC BLOOD PRESSURE: 58 MMHG | HEART RATE: 88 BPM | WEIGHT: 150 LBS

## 2019-05-24 DIAGNOSIS — I10 ESSENTIAL HYPERTENSION: Chronic | ICD-10-CM

## 2019-05-24 DIAGNOSIS — N18.4 CKD STAGE 4 DUE TO TYPE 2 DIABETES MELLITUS (HCC): ICD-10-CM

## 2019-05-24 DIAGNOSIS — I11.0 HYPERTENSIVE HEART DISEASE WITH DIASTOLIC HEART FAILURE (HCC): Chronic | ICD-10-CM

## 2019-05-24 DIAGNOSIS — E11.8 TYPE 2 DIABETES MELLITUS WITH COMPLICATION, WITHOUT LONG-TERM CURRENT USE OF INSULIN (HCC): Primary | ICD-10-CM

## 2019-05-24 DIAGNOSIS — E78.00 PURE HYPERCHOLESTEROLEMIA: Chronic | ICD-10-CM

## 2019-05-24 DIAGNOSIS — R07.9 LEFT-SIDED CHEST PAIN: ICD-10-CM

## 2019-05-24 DIAGNOSIS — E11.22 CKD STAGE 4 DUE TO TYPE 2 DIABETES MELLITUS (HCC): ICD-10-CM

## 2019-05-24 DIAGNOSIS — I50.30 HYPERTENSIVE HEART DISEASE WITH DIASTOLIC HEART FAILURE (HCC): Chronic | ICD-10-CM

## 2019-05-24 DIAGNOSIS — J44.9 CHRONIC OBSTRUCTIVE PULMONARY DISEASE, UNSPECIFIED COPD TYPE (HCC): ICD-10-CM

## 2019-05-24 PROCEDURE — 99214 OFFICE O/P EST MOD 30 MIN: CPT | Performed by: FAMILY MEDICINE

## 2019-05-24 RX ORDER — METOPROLOL TARTRATE 100 MG/1
100 TABLET ORAL DAILY
Qty: 90 TABLET | Refills: 3 | Status: SHIPPED | OUTPATIENT
Start: 2019-05-24 | End: 2019-11-15

## 2019-05-24 RX ORDER — LOSARTAN POTASSIUM 50 MG/1
50 TABLET ORAL DAILY
Qty: 90 TABLET | Refills: 3 | Status: SHIPPED | OUTPATIENT
Start: 2019-05-24 | End: 2019-11-15

## 2019-05-24 NOTE — PROGRESS NOTES
HPI:   Patient presents with:  Diabetes: f/u    Sahil Haynes is a 80year old male who presents for recheck of his diabetes. Subjective    doin g well from Diabetes Mellitus, but bad infection mother's day and 2 weeks before better.    Lab Results   Co appearance and bowel sounds are normal. There is no tenderness. Neurological: He is alert and oriented to person, place, and time. Skin: Skin is warm and dry. No rash noted. Psychiatric: He has a normal mood and affect.  Judgment and thought content n Primary    Overview     Dx in 60's , Glyburide 2.5, a1c consistently in 6% range.          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

## 2019-08-06 DIAGNOSIS — E11.8 TYPE 2 DIABETES MELLITUS WITH COMPLICATION, WITHOUT LONG-TERM CURRENT USE OF INSULIN (HCC): ICD-10-CM

## 2019-08-07 RX ORDER — GLYBURIDE 2.5 MG/1
2.5 TABLET ORAL
Qty: 90 TABLET | Refills: 3 | Status: SHIPPED | OUTPATIENT
Start: 2019-08-07 | End: 2020-09-24

## 2019-08-16 ENCOUNTER — TELEPHONE (OUTPATIENT)
Dept: CARDIOLOGY | Age: 84
End: 2019-08-16

## 2019-08-26 ENCOUNTER — TELEPHONE (OUTPATIENT)
Dept: FAMILY MEDICINE CLINIC | Facility: CLINIC | Age: 84
End: 2019-08-26

## 2019-08-26 NOTE — TELEPHONE ENCOUNTER
Patient's daughter called, states that patient has lesions on his skin, one looks infected, wants him to get in with only Dr. Jennifer Orantes. Patient's daughter was offered another provider as Dr. Jennifer Orantes is not in today, daughter declined.  Asking to speak with a nurse

## 2019-08-26 NOTE — TELEPHONE ENCOUNTER
Called and talked to daughter refused to see PA's or Dr Casie Mancuso only Dr Donavan Severe or rozina made appointment to see Dr Afua Mullins on Saturday.

## 2019-08-31 ENCOUNTER — OFFICE VISIT (OUTPATIENT)
Dept: FAMILY MEDICINE CLINIC | Facility: CLINIC | Age: 84
End: 2019-08-31
Payer: MEDICARE

## 2019-08-31 VITALS
WEIGHT: 149 LBS | SYSTOLIC BLOOD PRESSURE: 128 MMHG | TEMPERATURE: 99 F | HEART RATE: 84 BPM | DIASTOLIC BLOOD PRESSURE: 80 MMHG | BODY MASS INDEX: 26 KG/M2

## 2019-08-31 DIAGNOSIS — L03.113 CELLULITIS OF RIGHT UPPER EXTREMITY: Primary | ICD-10-CM

## 2019-08-31 PROCEDURE — 99213 OFFICE O/P EST LOW 20 MIN: CPT | Performed by: FAMILY MEDICINE

## 2019-08-31 RX ORDER — MUPIROCIN CALCIUM 20 MG/G
CREAM TOPICAL
Qty: 15 G | Refills: 1 | Status: ON HOLD | OUTPATIENT
Start: 2019-08-31 | End: 2021-01-01

## 2019-08-31 NOTE — PROGRESS NOTES
Lay Abdi is a 80year old male. HPI:   Patient is here with his daughter. Daughter reports he developed area on right forearm that was red. He had spontaneous drainage of purulent material several days ago.   She has been applying an over-the- for this Visit:  Requested Prescriptions     Signed Prescriptions Disp Refills   • Mupirocin Calcium 2 % External Cream 15 g 1     Sig: Use twice daily to affected area     Imaging & Consults:  None

## 2019-09-29 DIAGNOSIS — I50.30 HYPERTENSIVE HEART DISEASE WITH DIASTOLIC HEART FAILURE (HCC): Chronic | ICD-10-CM

## 2019-09-29 DIAGNOSIS — I11.0 HYPERTENSIVE HEART DISEASE WITH DIASTOLIC HEART FAILURE (HCC): Chronic | ICD-10-CM

## 2019-09-30 RX ORDER — HYDRALAZINE HYDROCHLORIDE 25 MG/1
TABLET, FILM COATED ORAL
Qty: 180 TABLET | Refills: 1 | Status: SHIPPED | OUTPATIENT
Start: 2019-09-30 | End: 2019-11-15

## 2019-10-14 ENCOUNTER — OFFICE VISIT (OUTPATIENT)
Dept: CARDIOLOGY | Age: 84
End: 2019-10-14

## 2019-10-14 VITALS
HEIGHT: 67 IN | DIASTOLIC BLOOD PRESSURE: 62 MMHG | SYSTOLIC BLOOD PRESSURE: 90 MMHG | BODY MASS INDEX: 25.9 KG/M2 | HEART RATE: 52 BPM | WEIGHT: 165 LBS

## 2019-10-14 DIAGNOSIS — I10 ESSENTIAL HYPERTENSION: ICD-10-CM

## 2019-10-14 DIAGNOSIS — E11.9 CONTROLLED TYPE 2 DIABETES MELLITUS WITHOUT COMPLICATION, WITH LONG-TERM CURRENT USE OF INSULIN (CMD): ICD-10-CM

## 2019-10-14 DIAGNOSIS — Z79.4 CONTROLLED TYPE 2 DIABETES MELLITUS WITHOUT COMPLICATION, WITH LONG-TERM CURRENT USE OF INSULIN (CMD): ICD-10-CM

## 2019-10-14 DIAGNOSIS — R09.89 BILATERAL CAROTID BRUITS: ICD-10-CM

## 2019-10-14 DIAGNOSIS — I25.10 CORONARY ARTERY DISEASE INVOLVING NATIVE CORONARY ARTERY OF NATIVE HEART WITHOUT ANGINA PECTORIS: Primary | ICD-10-CM

## 2019-10-14 PROCEDURE — 99214 OFFICE O/P EST MOD 30 MIN: CPT | Performed by: INTERNAL MEDICINE

## 2019-10-14 RX ORDER — LOSARTAN POTASSIUM 50 MG/1
50 TABLET ORAL DAILY
Qty: 90 TABLET | Refills: 3 | Status: SHIPPED | OUTPATIENT
Start: 2019-10-14 | End: 2020-11-09 | Stop reason: SDUPTHER

## 2019-10-14 RX ORDER — METOPROLOL TARTRATE 100 MG/1
100 TABLET ORAL DAILY
Qty: 90 TABLET | Refills: 3 | Status: SHIPPED | OUTPATIENT
Start: 2019-10-14 | End: 2020-11-09 | Stop reason: SDUPTHER

## 2019-10-14 RX ORDER — ATORVASTATIN CALCIUM 20 MG/1
20 TABLET, FILM COATED ORAL DAILY
Qty: 90 TABLET | Refills: 3 | Status: SHIPPED | OUTPATIENT
Start: 2019-10-14 | End: 2020-11-09 | Stop reason: SDUPTHER

## 2019-10-14 RX ORDER — HYDRALAZINE HYDROCHLORIDE 25 MG/1
25 TABLET, FILM COATED ORAL DAILY
Qty: 90 TABLET | Refills: 3 | Status: SHIPPED | OUTPATIENT
Start: 2019-10-14 | End: 2020-11-09 | Stop reason: SDUPTHER

## 2019-10-14 SDOH — HEALTH STABILITY: MENTAL HEALTH: HOW OFTEN DO YOU HAVE A DRINK CONTAINING ALCOHOL?: NEVER

## 2019-10-14 ASSESSMENT — ENCOUNTER SYMPTOMS
BRUISES/BLEEDS EASILY: 0
WEIGHT LOSS: 0
HEMOPTYSIS: 0
HEMATOCHEZIA: 0
SUSPICIOUS LESIONS: 0
WEIGHT GAIN: 0
COUGH: 0
ALLERGIC/IMMUNOLOGIC COMMENTS: NO NEW FOOD ALLERGIES
CHILLS: 0
FEVER: 0

## 2019-10-26 ENCOUNTER — IMMUNIZATION (OUTPATIENT)
Dept: FAMILY MEDICINE CLINIC | Facility: CLINIC | Age: 84
End: 2019-10-26
Payer: MEDICARE

## 2019-10-26 DIAGNOSIS — Z23 NEED FOR VACCINATION: ICD-10-CM

## 2019-10-26 PROCEDURE — G0008 ADMIN INFLUENZA VIRUS VAC: HCPCS | Performed by: FAMILY MEDICINE

## 2019-10-26 PROCEDURE — 90662 IIV NO PRSV INCREASED AG IM: CPT | Performed by: FAMILY MEDICINE

## 2019-11-15 ENCOUNTER — OFFICE VISIT (OUTPATIENT)
Dept: FAMILY MEDICINE CLINIC | Facility: CLINIC | Age: 84
End: 2019-11-15
Payer: MEDICARE

## 2019-11-15 VITALS
SYSTOLIC BLOOD PRESSURE: 104 MMHG | HEART RATE: 60 BPM | DIASTOLIC BLOOD PRESSURE: 52 MMHG | HEIGHT: 68 IN | WEIGHT: 150 LBS | RESPIRATION RATE: 16 BRPM | TEMPERATURE: 97 F | BODY MASS INDEX: 22.73 KG/M2

## 2019-11-15 DIAGNOSIS — I70.0 ATHEROSCLEROSIS OF AORTA (HCC): ICD-10-CM

## 2019-11-15 DIAGNOSIS — N18.4 CKD STAGE 4 DUE TO TYPE 2 DIABETES MELLITUS (HCC): ICD-10-CM

## 2019-11-15 DIAGNOSIS — E78.00 PURE HYPERCHOLESTEROLEMIA: Chronic | ICD-10-CM

## 2019-11-15 DIAGNOSIS — E11.22 CKD STAGE 4 DUE TO TYPE 2 DIABETES MELLITUS (HCC): ICD-10-CM

## 2019-11-15 DIAGNOSIS — J44.9 CHRONIC OBSTRUCTIVE PULMONARY DISEASE, UNSPECIFIED COPD TYPE (HCC): ICD-10-CM

## 2019-11-15 DIAGNOSIS — I11.0 HYPERTENSIVE HEART DISEASE WITH DIASTOLIC HEART FAILURE (HCC): Chronic | ICD-10-CM

## 2019-11-15 DIAGNOSIS — E11.42 DIABETIC POLYNEUROPATHY ASSOCIATED WITH TYPE 2 DIABETES MELLITUS (HCC): ICD-10-CM

## 2019-11-15 DIAGNOSIS — I50.30 HYPERTENSIVE HEART DISEASE WITH DIASTOLIC HEART FAILURE (HCC): Chronic | ICD-10-CM

## 2019-11-15 DIAGNOSIS — Z00.00 ANNUAL PHYSICAL EXAM: Primary | ICD-10-CM

## 2019-11-15 DIAGNOSIS — E78.2 MIXED HYPERLIPIDEMIA: ICD-10-CM

## 2019-11-15 DIAGNOSIS — I10 ESSENTIAL HYPERTENSION: Chronic | ICD-10-CM

## 2019-11-15 DIAGNOSIS — Z00.00 ENCOUNTER FOR ANNUAL HEALTH EXAMINATION: ICD-10-CM

## 2019-11-15 DIAGNOSIS — E11.8 DIABETES MELLITUS TYPE 2 WITH COMPLICATIONS (HCC): ICD-10-CM

## 2019-11-15 PROCEDURE — 83036 HEMOGLOBIN GLYCOSYLATED A1C: CPT | Performed by: FAMILY MEDICINE

## 2019-11-15 PROCEDURE — 99213 OFFICE O/P EST LOW 20 MIN: CPT | Performed by: FAMILY MEDICINE

## 2019-11-15 PROCEDURE — G0439 PPPS, SUBSEQ VISIT: HCPCS | Performed by: FAMILY MEDICINE

## 2019-11-15 RX ORDER — METOPROLOL TARTRATE 100 MG/1
100 TABLET ORAL DAILY
Qty: 90 TABLET | Refills: 3 | Status: SHIPPED | OUTPATIENT
Start: 2019-11-15 | End: 2020-11-06

## 2019-11-15 RX ORDER — LOSARTAN POTASSIUM 50 MG/1
50 TABLET ORAL DAILY
Qty: 90 TABLET | Refills: 3 | Status: SHIPPED | OUTPATIENT
Start: 2019-11-15 | End: 2019-12-23

## 2019-11-15 RX ORDER — HYDRALAZINE HYDROCHLORIDE 25 MG/1
25 TABLET, FILM COATED ORAL 2 TIMES DAILY
Qty: 180 TABLET | Refills: 3 | Status: SHIPPED | OUTPATIENT
Start: 2019-11-15 | End: 2020-11-06

## 2019-11-15 RX ORDER — ATORVASTATIN CALCIUM 20 MG/1
20 TABLET, FILM COATED ORAL DAILY
Qty: 90 TABLET | Refills: 3 | Status: SHIPPED | OUTPATIENT
Start: 2019-11-15 | End: 2019-11-15

## 2019-11-15 RX ORDER — ATORVASTATIN CALCIUM 20 MG/1
20 TABLET, FILM COATED ORAL DAILY
Qty: 90 TABLET | Refills: 3 | Status: SHIPPED | OUTPATIENT
Start: 2019-11-15 | End: 2020-11-06

## 2019-11-15 NOTE — PATIENT INSTRUCTIONS
Ct Moya's SCREENING SCHEDULE   Tests on this list are recommended by your physician but may not be covered, or covered at this frequency, by your insurer. Please check with your insurance carrier before scheduling to verify coverage.     Nidhi Martinez following criteria:   • Men who are 73-68 years old and have smoked more than 100 cigarettes in their lifetime   • Anyone with a family history    Colorectal Cancer Screening Covered up to Age 76     Colonoscopy Screen   Covered every 10 years- more often or performed in visit on 10/16/15   • PNEUMOCOCCAL VACC, 13 DEJA IM    Please get once after your 65th birthday    Pneumococcal 23 (Pneumovax)  Covered Once after 65 No orders found for this or any previous visit.  Please get once after your 65th birthday

## 2019-11-15 NOTE — PROGRESS NOTES
HPI:   Lee Wang is a 80year old male who presents for a Medicare Subsequent Annual Wellness visit (Pt already had Initial Annual Wellness). Doing well with DTR's' management.   Annual Physical due on 11/23/2019        Fall/Risk Assessment   He h (over the last two weeks)?: Not at all  PHQ-2 SCORE: 0     Advanced Directive: He has a Living Will on file in [place]. He has a Power of  for [employer] on file in [place]. He smoked tobacco in the past but quit greater than 12 months ago.   Soci (H) 05/21/2019    GLU 83 05/21/2019        CBC  (most recent labs)   Lab Results   Component Value Date    WBC 6.8 05/21/2019    HGB 11.9 (L) 05/21/2019    .0 05/21/2019        ALLERGIES:   He has No Known Allergies.     CURRENT MEDICATIONS:   HYDRAL His family history includes Cancer in his father; pneumonia in his mother. SOCIAL HISTORY:   He  reports that he quit smoking about 17 years ago. He has never used smokeless tobacco. He reports that he does not drink alcohol or use drugs.      REVIEW OF light. Conjunctivae and EOM are normal.   Neck: Trachea normal and normal range of motion. Neck supple. Normal carotid pulses present. No edema present. No thyroid mass and no thyromegaly present.    Cardiovascular: Normal rate, regular rhythm, S1 normal, S ASSESSMENT AND OTHER RELEVANT CHRONIC CONDITIONS:   Otoniel Koroma is a 80year old male who presents for a Medicare Assessment. PLAN SUMMARY:      Last A1c value was 6.2% done 11/15/2019.    Diet assessment: good     PLAN:  The patient indicat meds. Continue present management          Relevant Medications    Albuterol Sulfate (PROAIR RESPICLICK) 831 (90 Base) MCG/ACT Inhalation Aerosol Powder, Breath Activated    Other Relevant Orders    OFFICE/OUTPT VISIT,ESTMICHAEL III       Endocrine    CKD st In the past six months, have you lost more than 10 pounds without trying?: 1 - Yes  Has your appetite been poor?: No  How does the patient maintain a good energy level?: Appropriate Exercise  How would you describe your daily physical activity?: Light  H 13 (Prevnar)  Covered Once after 65 10/16/2015 Please get once after your 65th birthday    Pneumococcal 23 (Pneumovax)  Covered Once after 65 03/16/2012 Please get once after your 65th birthday    Hepatitis B for Moderate/High Risk No vaccine history found exam  Annually Data entered on: 5/17/2018   Last Dilated Eye Exam (No Data)     No flowsheet data found. COPD      Spirometry Testing Annually Spirometry date:  No flowsheet data found.

## 2019-12-22 DIAGNOSIS — K21.9 GASTROESOPHAGEAL REFLUX DISEASE WITHOUT ESOPHAGITIS: Chronic | ICD-10-CM

## 2019-12-23 RX ORDER — PANTOPRAZOLE SODIUM 40 MG/1
TABLET, DELAYED RELEASE ORAL
Qty: 90 TABLET | Refills: 4 | OUTPATIENT
Start: 2019-12-23

## 2019-12-23 RX ORDER — LOSARTAN POTASSIUM 100 MG/1
50 TABLET ORAL DAILY
Qty: 45 TABLET | Refills: 3 | Status: SHIPPED | OUTPATIENT
Start: 2019-12-23 | End: 2020-11-06

## 2019-12-23 NOTE — TELEPHONE ENCOUNTER
LOV 11/15/2019     Patient was asked to follow-up in: 6 months    Appointment scheduled: 5/22/2020 Yasmany Gonzales MD     Refill request for:    Requested Prescriptions     Pending Prescriptions Disp Refills   • PANTOPRAZOLE SODIUM 40 MG Oral Tab EC [Pharmac

## 2019-12-24 NOTE — TELEPHONE ENCOUNTER
Please notify:    Requested Prescriptions     Signed Prescriptions Disp Refills   • losartan 100 MG Oral Tab 45 tablet 3     Sig: Take 0.5 tablets (50 mg total) by mouth daily.      Authorizing Provider: Sabine Nix to:      [facility] [id number] IN [employer]

## 2019-12-24 NOTE — TELEPHONE ENCOUNTER
Per Pharmacy Losartan 50 mg is on back order. Pharmacy states that we can order Losartan 100 mg, Please advise.     Routed to Dr. Manpreet Kelley

## 2020-01-01 ENCOUNTER — PATIENT MESSAGE (OUTPATIENT)
Dept: FAMILY MEDICINE CLINIC | Facility: CLINIC | Age: 85
End: 2020-01-01

## 2020-01-01 DIAGNOSIS — I10 HYPERTENSION, ESSENTIAL: Primary | ICD-10-CM

## 2020-01-01 DIAGNOSIS — N40.1 BPH WITH URINARY OBSTRUCTION: ICD-10-CM

## 2020-01-01 DIAGNOSIS — I70.0 ATHEROSCLEROSIS OF AORTA (HCC): ICD-10-CM

## 2020-01-01 DIAGNOSIS — E11.40 PAINFUL DIABETIC NEUROPATHY (HCC): ICD-10-CM

## 2020-01-01 DIAGNOSIS — N13.8 BPH WITH URINARY OBSTRUCTION: ICD-10-CM

## 2020-01-01 DIAGNOSIS — E11.8 DM (DIABETES MELLITUS), TYPE 2 WITH COMPLICATIONS (HCC): ICD-10-CM

## 2020-01-01 DIAGNOSIS — H54.8 LEGAL BLINDNESS, AS DEFINED IN USA: ICD-10-CM

## 2020-01-01 DIAGNOSIS — J44.9 CHRONIC OBSTRUCTIVE PULMONARY DISEASE, UNSPECIFIED COPD TYPE (HCC): ICD-10-CM

## 2020-01-01 DIAGNOSIS — I50.30 DIASTOLIC HEART FAILURE, UNSPECIFIED HF CHRONICITY (HCC): ICD-10-CM

## 2020-04-02 DIAGNOSIS — I50.30 HYPERTENSIVE HEART DISEASE WITH DIASTOLIC HEART FAILURE (HCC): Chronic | ICD-10-CM

## 2020-04-02 DIAGNOSIS — I11.0 HYPERTENSIVE HEART DISEASE WITH DIASTOLIC HEART FAILURE (HCC): Chronic | ICD-10-CM

## 2020-04-02 RX ORDER — HYDRALAZINE HYDROCHLORIDE 25 MG/1
TABLET, FILM COATED ORAL
Qty: 180 TABLET | Refills: 0 | OUTPATIENT
Start: 2020-04-02

## 2020-04-07 ENCOUNTER — TELEPHONE (OUTPATIENT)
Dept: FAMILY MEDICINE CLINIC | Facility: CLINIC | Age: 85
End: 2020-04-07

## 2020-04-07 DIAGNOSIS — E11.8 DIABETES MELLITUS TYPE 2 WITH COMPLICATIONS (HCC): Primary | ICD-10-CM

## 2020-04-07 NOTE — TELEPHONE ENCOUNTER
Medication refilled per protocol.      Requested Prescriptions     Signed Prescriptions Disp Refills   • Glucose Blood (CONTOUR NEXT TEST) In Vitro Strip 100 strip 1     Sig: Tests twice daily     Authorizing Provider: Tanesha Fall     Ordering User: Destiny Heck

## 2020-05-21 NOTE — ASSESSMENT & PLAN NOTE
Stable, Continue present management.     Blood Pressure and Cardiac Medications          losartan 100 MG Oral Tab    Metoprolol Tartrate 100 MG Oral Tab    hydrALAzine HCl 25 MG Oral Tab

## 2020-05-21 NOTE — ASSESSMENT & PLAN NOTE
Stable fucntion.    Lab Results   Component Value Date/Time    CREATSERUM 1.74 (H) 05/21/2019 07:10 AM    GFR 31 (L) 11/10/2017 08:37 AM    GFRNAA 34 (L) 05/21/2019 07:10 AM

## 2020-05-22 ENCOUNTER — VIRTUAL PHONE E/M (OUTPATIENT)
Dept: FAMILY MEDICINE CLINIC | Facility: CLINIC | Age: 85
End: 2020-05-22
Payer: MEDICARE

## 2020-05-22 DIAGNOSIS — I70.0 ATHEROSCLEROSIS OF AORTA (HCC): ICD-10-CM

## 2020-05-22 DIAGNOSIS — E11.8 DIABETES MELLITUS TYPE 2 WITH COMPLICATIONS (HCC): ICD-10-CM

## 2020-05-22 DIAGNOSIS — I10 ESSENTIAL HYPERTENSION: Chronic | ICD-10-CM

## 2020-05-22 DIAGNOSIS — E11.22 CKD STAGE 4 DUE TO TYPE 2 DIABETES MELLITUS (HCC): ICD-10-CM

## 2020-05-22 DIAGNOSIS — E11.42 DIABETIC POLYNEUROPATHY ASSOCIATED WITH TYPE 2 DIABETES MELLITUS (HCC): ICD-10-CM

## 2020-05-22 DIAGNOSIS — I50.30 HYPERTENSIVE HEART DISEASE WITH DIASTOLIC HEART FAILURE (HCC): Primary | Chronic | ICD-10-CM

## 2020-05-22 DIAGNOSIS — J44.9 CHRONIC OBSTRUCTIVE PULMONARY DISEASE, UNSPECIFIED COPD TYPE (HCC): ICD-10-CM

## 2020-05-22 DIAGNOSIS — K21.9 GASTROESOPHAGEAL REFLUX DISEASE WITHOUT ESOPHAGITIS: Chronic | ICD-10-CM

## 2020-05-22 DIAGNOSIS — H35.3230 BILATERAL EXUDATIVE AGE-RELATED MACULAR DEGENERATION, UNSPECIFIED STAGE (HCC): Chronic | ICD-10-CM

## 2020-05-22 DIAGNOSIS — E78.2 MIXED HYPERLIPIDEMIA: ICD-10-CM

## 2020-05-22 DIAGNOSIS — N18.4 CKD STAGE 4 DUE TO TYPE 2 DIABETES MELLITUS (HCC): ICD-10-CM

## 2020-05-22 DIAGNOSIS — I11.0 HYPERTENSIVE HEART DISEASE WITH DIASTOLIC HEART FAILURE (HCC): Primary | Chronic | ICD-10-CM

## 2020-05-22 PROCEDURE — 99442 PHONE E/M BY PHYS 11-20 MIN: CPT | Performed by: FAMILY MEDICINE

## 2020-05-22 RX ORDER — PANTOPRAZOLE SODIUM 40 MG/1
40 TABLET, DELAYED RELEASE ORAL
Qty: 90 TABLET | Refills: 4 | Status: SHIPPED | OUTPATIENT
Start: 2020-05-22 | End: 2020-06-24

## 2020-05-22 NOTE — PROGRESS NOTES
Post-Op Assessment Note    CV Status:  Stable  Pain Score: 0    Pain management: adequate     Mental Status:  Awake and sleepy   Hydration Status:  Euvolemic and stable   PONV Controlled:  Controlled   Airway Patency:  Patent   Post Op Vitals Reviewed: Yes      Staff: Anesthesiologist           BP   114/63   Temp     Pulse  67   Resp   16   SpO2   99 Virtual/Telephone Check-In    Lee Wang verbally consents to a [employer] on 05/22/20. Patient has been referred to the [facility] website at [email or web address] to review the yearly Consent to Treat document.   Patient unders plan.    Problem List Items Addressed This Visit        Cardiovascular    Atherosclerosis of aorta ([facility] [address]    Overview     Tortuous aorta seen 2011, on atorvastatin 20         Current Assessment & Plan     Stable continue present management          [place] weight by increased dietary compliance and exercise and will check labs as ordered.     Lab Results   Component Value Date    A1C 6.2 (A) 11/15/2019    A1C 6.5 (H) 05/21/2019      Blood Sugar Medications          GLYBURIDE 2.5 MG Oral Tab

## 2020-06-16 DIAGNOSIS — K21.9 GASTROESOPHAGEAL REFLUX DISEASE WITHOUT ESOPHAGITIS: Chronic | ICD-10-CM

## 2020-06-16 RX ORDER — PANTOPRAZOLE SODIUM 40 MG/1
TABLET, DELAYED RELEASE ORAL
Qty: 90 TABLET | Refills: 0 | OUTPATIENT
Start: 2020-06-16

## 2020-06-23 DIAGNOSIS — K21.9 GASTROESOPHAGEAL REFLUX DISEASE WITHOUT ESOPHAGITIS: Chronic | ICD-10-CM

## 2020-06-24 RX ORDER — PANTOPRAZOLE SODIUM 40 MG/1
TABLET, DELAYED RELEASE ORAL
Qty: 90 TABLET | Refills: 3 | Status: SHIPPED | OUTPATIENT
Start: 2020-06-24 | End: 2021-01-01

## 2020-09-20 DIAGNOSIS — E11.8 TYPE 2 DIABETES MELLITUS WITH COMPLICATION, WITHOUT LONG-TERM CURRENT USE OF INSULIN (HCC): ICD-10-CM

## 2020-09-24 RX ORDER — GLYBURIDE 2.5 MG/1
2.5 TABLET ORAL
Qty: 90 TABLET | Refills: 3 | Status: SHIPPED | OUTPATIENT
Start: 2020-09-24 | End: 2021-01-01

## 2020-09-24 NOTE — TELEPHONE ENCOUNTER
GLYBURIDE 2.5 MG TABLET          Will file in chart as: GLYBURIDE 2.5 MG Oral Tab    Sig: TAKE 1 TABLET (2.5 MG TOTAL) BY MOUTH DAILY WITH BREAKFAST.     Disp:  90 tablet    Refills:  3    Start: 9/20/2020    Diabetic Medication Protocol [id number]/2020 09

## 2020-09-28 ENCOUNTER — HOSPITAL ENCOUNTER (OUTPATIENT)
Age: 85
Discharge: HOME | End: 2020-09-28
Attending: EMERGENCY MEDICINE
Payer: MEDICARE

## 2020-09-28 ENCOUNTER — APPOINTMENT (OUTPATIENT)
Dept: GENERAL RADIOLOGY | Age: 85
End: 2020-09-28
Attending: NURSE PRACTITIONER
Payer: MEDICARE

## 2020-09-28 ENCOUNTER — TELEPHONE (OUTPATIENT)
Dept: SURGERY | Facility: HOSPITAL | Age: 85
End: 2020-09-28

## 2020-09-28 ENCOUNTER — APPOINTMENT (OUTPATIENT)
Dept: CT IMAGING | Age: 85
End: 2020-09-28
Attending: NURSE PRACTITIONER
Payer: MEDICARE

## 2020-09-28 VITALS
SYSTOLIC BLOOD PRESSURE: 157 MMHG | HEART RATE: 65 BPM | TEMPERATURE: 98 F | RESPIRATION RATE: 18 BRPM | DIASTOLIC BLOOD PRESSURE: 71 MMHG | OXYGEN SATURATION: 98 %

## 2020-09-28 DIAGNOSIS — N28.9 RENAL INSUFFICIENCY: ICD-10-CM

## 2020-09-28 DIAGNOSIS — R10.13 EPIGASTRIC PAIN: Primary | ICD-10-CM

## 2020-09-28 DIAGNOSIS — R33.9 URINARY RETENTION: ICD-10-CM

## 2020-09-28 LAB
#MXD IC: 1.4 X10ˆ3/UL (ref 0.1–1)
ATRIAL RATE: 74 BPM
CREAT BLD-MCNC: 2.5 MG/DL (ref 0.7–1.3)
GLUCOSE BLD-MCNC: 177 MG/DL (ref 70–99)
HCT VFR BLD AUTO: 39.3 %
HGB BLD-MCNC: 13 G/DL
ISTAT BUN: 34 MG/DL (ref 7–18)
ISTAT CHLORIDE: 106 MMOL/L (ref 98–112)
ISTAT HEMATOCRIT: 39 %
ISTAT IONIZED CALCIUM FOR CHEM 8: 1.19 MMOL/L (ref 1.12–1.32)
ISTAT POTASSIUM: 4.1 MMOL/L (ref 3.6–5.1)
ISTAT SODIUM: 138 MMOL/L (ref 136–145)
ISTAT TCO2: 21 MMOL/L (ref 21–32)
LYMPHOCYTES # BLD AUTO: 2 X10ˆ3/UL (ref 1–4)
LYMPHOCYTES NFR BLD AUTO: 26.9 %
MCH RBC QN AUTO: 31.6 PG (ref 26–34)
MCHC RBC AUTO-ENTMCNC: 33.1 G/DL (ref 31–37)
MCV RBC AUTO: 95.4 FL (ref 80–100)
MIXED CELL %: 18.3 %
NEUTROPHILS # BLD AUTO: 4 X10ˆ3/UL (ref 1.5–7.7)
NEUTROPHILS NFR BLD AUTO: 54.8 %
P AXIS: -26 DEGREES
P-R INTERVAL: 150 MS
PLATELET # BLD AUTO: 243 X10ˆ3/UL (ref 150–450)
Q-T INTERVAL: 432 MS
QRS DURATION: 130 MS
QTC CALCULATION (BEZET): 479 MS
R AXIS: -35 DEGREES
RBC # BLD AUTO: 4.12 X10ˆ6/UL
T AXIS: 127 DEGREES
TROPONIN I BLD-MCNC: <0.02 NG/ML
VENTRICULAR RATE: 74 BPM
WBC # BLD AUTO: 7.4 X10ˆ3/UL (ref 4–11)

## 2020-09-28 PROCEDURE — 93005 ELECTROCARDIOGRAM TRACING: CPT

## 2020-09-28 PROCEDURE — 99215 OFFICE O/P EST HI 40 MIN: CPT

## 2020-09-28 PROCEDURE — 51701 INSERT BLADDER CATHETER: CPT

## 2020-09-28 PROCEDURE — 74176 CT ABD & PELVIS W/O CONTRAST: CPT | Performed by: NURSE PRACTITIONER

## 2020-09-28 PROCEDURE — 85025 COMPLETE CBC W/AUTO DIFF WBC: CPT | Performed by: NURSE PRACTITIONER

## 2020-09-28 PROCEDURE — 71101 X-RAY EXAM UNILAT RIBS/CHEST: CPT | Performed by: NURSE PRACTITIONER

## 2020-09-28 PROCEDURE — 93010 ELECTROCARDIOGRAM REPORT: CPT

## 2020-09-28 PROCEDURE — 84484 ASSAY OF TROPONIN QUANT: CPT

## 2020-09-28 PROCEDURE — 36415 COLL VENOUS BLD VENIPUNCTURE: CPT

## 2020-09-28 PROCEDURE — 80047 BASIC METABLC PNL IONIZED CA: CPT

## 2020-09-28 RX ORDER — TAMSULOSIN HYDROCHLORIDE 0.4 MG/1
0.4 CAPSULE ORAL DAILY
Qty: 14 CAPSULE | Refills: 0 | Status: SHIPPED | OUTPATIENT
Start: 2020-09-28 | End: 2020-09-29

## 2020-09-28 RX ORDER — LIDOCAINE HYDROCHLORIDE 20 MG/ML
10 SOLUTION OROPHARYNGEAL ONCE
Status: COMPLETED | OUTPATIENT
Start: 2020-09-28 | End: 2020-09-28

## 2020-09-28 RX ORDER — LIDOCAINE 50 MG/G
1 PATCH TOPICAL
Qty: 6 PATCH | Refills: 0 | Status: SHIPPED | OUTPATIENT
Start: 2020-09-28 | End: 2020-11-06 | Stop reason: ALTCHOICE

## 2020-09-28 RX ORDER — MAGNESIUM HYDROXIDE/ALUMINUM HYDROXICE/SIMETHICONE 120; 1200; 1200 MG/30ML; MG/30ML; MG/30ML
30 SUSPENSION ORAL ONCE
Status: COMPLETED | OUTPATIENT
Start: 2020-09-28 | End: 2020-09-28

## 2020-09-28 NOTE — TELEPHONE ENCOUNTER
[facility] called I/O cath'd pt for PVR 1300 ml but they don't have indwelling gonzalez and daughter fears UTIs. Daughter refused Office visit for gonzalez today. 3:30 with BF, so I told [facility] to teach [facility] and see me 8:15 tomorrow to [facility].  WIll start flomax,

## 2020-09-28 NOTE — ED INITIAL ASSESSMENT (HPI)
Pt has had 10 days aof severe left sided upper quadrant abdominal pain, and epigastric pain to the center going left.   Pt is alert and awake, no fever, no problem with nausea, vomiting or diarrhea

## 2020-09-28 NOTE — ED PROVIDER NOTES
Patient is  Patient Seen in: THE [facility] Immediate Care In [facility] END      History   Patient presents with:  Abdominal Pain  Chest Pain    Stated Complaint: short of breath / epigastric pain    HPI  Patient is a 59-year-old male with past medical history of ty SURGICAL HISTORY  1/2006    prostate surgery   • OTHER SURGICAL HISTORY      polys remove by Lauren Alva                Family history reviewed with patient/caregiver and is not pertinent to presenting problem.     Social History    Tobacco Use      Smoking sta CVA tenderness, left CVA tenderness, guarding or rebound. Negative signs include Vargas's sign, Rovsing's sign, McBurney's sign, psoas sign and obturator sign.       Comments: Large ventral hernia is present   Musculoskeletal:      Comments: Chest wall with cranio-caudal plane. Dose reduction techniques were used. Dose information is transmitted to the ACR [facility] [employer] Radiology) NRDR ([address]) which includes the Dose Index Registry.   PATIENT STATED HISTORY: (As transcribed b areas of septations/trabeculations and small scattered diverticula, similar to the prior. 2. Colonic diverticulosis. No acute inflammation. 3. Cholelithiasis. 4. Stable small infrarenal abdominal aortic aneurysm. 5. Details as above.   Continued clinical c 79-year-old gentleman with multiple comorbidities including COPD and esophageal reflux presents with approximate 10-day history of epigastric and left anterior rib pain. epigastric tenderness which was somewhat relieved after GI cocktail.   Lab wor MD  [address] [place] [address]  [phone]      8:15 am tomorrow          Medications Prescribed:  Current Discharge Medication List    START taking these medications    tamsulosin (FLOMAX) cap  Take 1 capsule (0.4 mg total) by mouth

## 2020-09-28 NOTE — ED PROVIDER NOTES
79-year-old male who was initially seen by the nurse practitioner but I also evaluated the patient as well and discussed care and treatment with the patient and his daughter who presented with what sounds like musculoskeletal pain that occurs more at night

## 2020-10-05 ENCOUNTER — APPOINTMENT (OUTPATIENT)
Dept: CARDIOLOGY | Age: 85
End: 2020-10-05

## 2020-10-26 ENCOUNTER — TELEPHONE (OUTPATIENT)
Dept: FAMILY MEDICINE CLINIC | Facility: CLINIC | Age: 85
End: 2020-10-26

## 2020-10-26 NOTE — TELEPHONE ENCOUNTER
Patient's daughter Emmette Ammon called stated patient has [address] [place] [address] scheduled for 11/6 and wants to know if Dr. Chioma Musa is willing to see patient same day as her scheduled appointment which is 11/12 at 7:45a.m.  Daughter stated patient is going through a lot and it would

## 2020-11-02 NOTE — TELEPHONE ENCOUNTER
Pt's daughter Mignon Mortimer is ok with Video visit she verbalized understanding all the steps and will e check in and sign in 10 prior to appt.

## 2020-11-02 NOTE — TELEPHONE ENCOUNTER
There is no way I can actually double book 1/2-hour visit in 2 0 minutes! We can do a virtual Medicare annual wellness visit on the sixth as it can be done both as a video or as a telephone call.   That might be a much better option than bringing him in on

## 2020-11-06 ENCOUNTER — TELEMEDICINE (OUTPATIENT)
Dept: FAMILY MEDICINE CLINIC | Facility: CLINIC | Age: 85
End: 2020-11-06

## 2020-11-06 VITALS — DIASTOLIC BLOOD PRESSURE: 68 MMHG | SYSTOLIC BLOOD PRESSURE: 138 MMHG | HEART RATE: 72 BPM

## 2020-11-06 DIAGNOSIS — K59.09 CHRONIC CONSTIPATION: ICD-10-CM

## 2020-11-06 DIAGNOSIS — E11.8 DIABETES MELLITUS TYPE 2 WITH COMPLICATIONS (HCC): ICD-10-CM

## 2020-11-06 DIAGNOSIS — I50.30 HYPERTENSIVE HEART DISEASE WITH DIASTOLIC HEART FAILURE (HCC): Chronic | ICD-10-CM

## 2020-11-06 DIAGNOSIS — Z00.00 ENCOUNTER FOR ANNUAL HEALTH EXAMINATION: ICD-10-CM

## 2020-11-06 DIAGNOSIS — E11.42 DIABETIC POLYNEUROPATHY ASSOCIATED WITH TYPE 2 DIABETES MELLITUS (HCC): ICD-10-CM

## 2020-11-06 DIAGNOSIS — E11.22 CKD STAGE 4 DUE TO TYPE 2 DIABETES MELLITUS (HCC): ICD-10-CM

## 2020-11-06 DIAGNOSIS — Z00.00 ANNUAL PHYSICAL EXAM: Primary | ICD-10-CM

## 2020-11-06 DIAGNOSIS — J44.9 CHRONIC OBSTRUCTIVE PULMONARY DISEASE, UNSPECIFIED COPD TYPE (HCC): ICD-10-CM

## 2020-11-06 DIAGNOSIS — I70.0 ATHEROSCLEROSIS OF AORTA (HCC): ICD-10-CM

## 2020-11-06 DIAGNOSIS — I10 ESSENTIAL HYPERTENSION: Chronic | ICD-10-CM

## 2020-11-06 DIAGNOSIS — E78.2 MIXED HYPERLIPIDEMIA: ICD-10-CM

## 2020-11-06 DIAGNOSIS — I11.0 HYPERTENSIVE HEART DISEASE WITH DIASTOLIC HEART FAILURE (HCC): Chronic | ICD-10-CM

## 2020-11-06 DIAGNOSIS — N18.4 CKD STAGE 4 DUE TO TYPE 2 DIABETES MELLITUS (HCC): ICD-10-CM

## 2020-11-06 PROCEDURE — G0439 PPPS, SUBSEQ VISIT: HCPCS | Performed by: FAMILY MEDICINE

## 2020-11-06 PROCEDURE — 99213 OFFICE O/P EST LOW 20 MIN: CPT | Performed by: FAMILY MEDICINE

## 2020-11-06 RX ORDER — ATORVASTATIN CALCIUM 20 MG/1
20 TABLET, FILM COATED ORAL DAILY
Qty: 90 TABLET | Refills: 3 | Status: SHIPPED | OUTPATIENT
Start: 2020-11-06 | End: 2021-01-01

## 2020-11-06 RX ORDER — LOSARTAN POTASSIUM 50 MG/1
50 TABLET ORAL DAILY
Qty: 90 TABLET | Refills: 3 | Status: SHIPPED | OUTPATIENT
Start: 2020-11-06 | End: 2021-01-01

## 2020-11-06 RX ORDER — LOSARTAN POTASSIUM 50 MG/1
50 TABLET ORAL DAILY
COMMUNITY
Start: 2020-09-15 | End: 2020-11-06

## 2020-11-06 RX ORDER — METOPROLOL TARTRATE 100 MG/1
100 TABLET ORAL DAILY
Qty: 90 TABLET | Refills: 3 | Status: SHIPPED | OUTPATIENT
Start: 2020-11-06 | End: 2021-01-01

## 2020-11-06 RX ORDER — HYDRALAZINE HYDROCHLORIDE 25 MG/1
25 TABLET, FILM COATED ORAL 2 TIMES DAILY
Qty: 180 TABLET | Refills: 3 | Status: SHIPPED | OUTPATIENT
Start: 2020-11-06 | End: 2021-01-01

## 2020-11-06 NOTE — ASSESSMENT & PLAN NOTE
Stable continue present management . [place] Master    Lab Results   Component Value Date/Time    CREATSERUM 1.74 (H) 05/21/2019 07:10 AM    GFR 31 (L) 11/10/2017 08:37 AM    GFRNAA 22 (L) 09/28/2020 09:56 AM

## 2020-11-06 NOTE — PATIENT INSTRUCTIONS
Ada Moya's SCREENING SCHEDULE   Tests on this list are recommended by your physician but may not be covered, or covered at this frequency, by your insurer. Please check with your insurance carrier before scheduling to verify coverage.     Favian Butcher following criteria:   • Men who are 73-68 years old and have smoked more than 100 cigarettes in their lifetime   • Anyone with a family history    Colorectal Cancer Screening Covered up to Age 76     Colonoscopy Screen   Covered every 10 years- more often or performed in visit on 10/16/15   • PNEUMOCOCCAL VACC, 13 DEJA IM    Please get once after your 65th birthday    Pneumococcal 23 (Pneumovax)  Covered Once after 65 No orders found for this or any previous visit.  Please get once after your 65th birthday

## 2020-11-06 NOTE — PROGRESS NOTES
This visit is conducted using Telemedicine with live, interactive video and audio. Patient has been referred to the [facility] website at [email or web address] to review the yearly Consent to Treat document.     Patient understands and accepts financial res status. Taking medications as prescribed: (P) Cannot do without help   He has difficulties Affording Meds based on screening of functional status.    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 (ONETOUCH SURESOFT LANCING DEV) Does not apply Misc, Use with lancets as needed    •  ONETOUCH ULTRASOFT LANCETS Does not apply Misc, Use 2 times daily to check blood sugar    •  NON FORMULARY, Take 1 tablet by mouth 2 (two) times daily.     •  NON FO Impaired vistion    Physical Exam   Nursing note and vitals reviewed. Constitutional: He is oriented to person, place, and time. Vital signs are normal. He appears well-developed and well-nourished. HENT:   Head: Normocephalic and atraumatic.    Rig normal.         Vaccination History     Immunization History   Administered Date(s) Administered   • Depo-Medrol 40mg Inj 04/28/2020, 05/18/2020   • FLU VAC High Dose 72 YRS & Older PRSV Free (56137) 10/10/2014, 09/19/2015, 10/26/2019, 08/31/2020   • FLUAD showed EF 55%         Current Assessment & Plan     Stable continue present management          Relevant Medications    Metoprolol Tartrate 100 MG Oral Tab    hydrALAzine HCl 25 MG Oral Tab    Other Relevant Orders    [facility] REFER TO CHRONIC CARE MGMT    OFFI will check labs as ordered.     Lab Results   Component Value Date    A1C 6.2 (A) 11/15/2019    A1C 6.5 (H) 05/21/2019      Blood Sugar Medications          GLYBURIDE 2.5 MG Oral Tab                   Relevant Orders    [facility] REFER TO CHRONIC CARE MGMT    OFF Cholesterol (mg/dL)   Date Value   05/21/2019 63     LDL CHOLESTROL (mg/dL)   Date Value   05/24/2014 89        EKG - w/ Initial Preventative Physical Exam only, or if medically necessary Electrocardiogram date09/28/2020    Colorectal Cancer Screening benefits      SPECIFIC DISEASE MONITORING Internal Lab or Procedure External Lab or Procedure      Annual Monitoring of Persistent     Medications (ACE/ARB, digoxin diuretics, anticonvulsants.)    Potassium  Annually Potassium (mmol/L)   Date Value   05/21

## 2020-11-09 ENCOUNTER — TELEPHONE (OUTPATIENT)
Dept: CARDIOLOGY | Age: 85
End: 2020-11-09

## 2020-11-09 ENCOUNTER — OFFICE VISIT (OUTPATIENT)
Dept: CARDIOLOGY | Age: 85
End: 2020-11-09

## 2020-11-09 VITALS
SYSTOLIC BLOOD PRESSURE: 130 MMHG | WEIGHT: 162 LBS | BODY MASS INDEX: 24.55 KG/M2 | DIASTOLIC BLOOD PRESSURE: 61 MMHG | HEART RATE: 70 BPM | HEIGHT: 68 IN

## 2020-11-09 DIAGNOSIS — I10 ESSENTIAL HYPERTENSION: ICD-10-CM

## 2020-11-09 DIAGNOSIS — I25.10 CORONARY ARTERY DISEASE INVOLVING NATIVE CORONARY ARTERY OF NATIVE HEART WITHOUT ANGINA PECTORIS: Primary | ICD-10-CM

## 2020-11-09 DIAGNOSIS — E11.9 CONTROLLED TYPE 2 DIABETES MELLITUS WITHOUT COMPLICATION, WITH LONG-TERM CURRENT USE OF INSULIN (CMD): ICD-10-CM

## 2020-11-09 DIAGNOSIS — R09.89 BILATERAL CAROTID BRUITS: ICD-10-CM

## 2020-11-09 DIAGNOSIS — Z79.4 CONTROLLED TYPE 2 DIABETES MELLITUS WITHOUT COMPLICATION, WITH LONG-TERM CURRENT USE OF INSULIN (CMD): ICD-10-CM

## 2020-11-09 PROCEDURE — 99213 OFFICE O/P EST LOW 20 MIN: CPT | Performed by: INTERNAL MEDICINE

## 2020-11-09 RX ORDER — HYDRALAZINE HYDROCHLORIDE 25 MG/1
25 TABLET, FILM COATED ORAL 2 TIMES DAILY
Qty: 180 TABLET | Refills: 3 | Status: SHIPPED | OUTPATIENT
Start: 2020-11-09

## 2020-11-09 RX ORDER — METOPROLOL TARTRATE 100 MG/1
100 TABLET ORAL DAILY
Qty: 90 TABLET | Refills: 3 | Status: SHIPPED | OUTPATIENT
Start: 2020-11-09

## 2020-11-09 RX ORDER — HYDRALAZINE HYDROCHLORIDE 25 MG/1
25 TABLET, FILM COATED ORAL DAILY
Qty: 90 TABLET | Refills: 3 | Status: SHIPPED | OUTPATIENT
Start: 2020-11-09 | End: 2020-11-09 | Stop reason: SDUPTHER

## 2020-11-09 RX ORDER — LOSARTAN POTASSIUM 50 MG/1
50 TABLET ORAL DAILY
Qty: 90 TABLET | Refills: 3 | Status: SHIPPED | OUTPATIENT
Start: 2020-11-09

## 2020-11-09 RX ORDER — ATORVASTATIN CALCIUM 20 MG/1
20 TABLET, FILM COATED ORAL DAILY
Qty: 90 TABLET | Refills: 3 | Status: SHIPPED | OUTPATIENT
Start: 2020-11-09

## 2020-11-09 SDOH — HEALTH STABILITY: MENTAL HEALTH: HOW OFTEN DO YOU HAVE A DRINK CONTAINING ALCOHOL?: NEVER

## 2020-11-09 ASSESSMENT — PATIENT HEALTH QUESTIONNAIRE - PHQ9
2. FEELING DOWN, DEPRESSED OR HOPELESS: NOT AT ALL
1. LITTLE INTEREST OR PLEASURE IN DOING THINGS: NOT AT ALL
SUM OF ALL RESPONSES TO PHQ9 QUESTIONS 1 AND 2: 0
CLINICAL INTERPRETATION OF PHQ9 SCORE: NO FURTHER SCREENING NEEDED
CLINICAL INTERPRETATION OF PHQ2 SCORE: NO FURTHER SCREENING NEEDED
SUM OF ALL RESPONSES TO PHQ9 QUESTIONS 1 AND 2: 0

## 2020-11-09 ASSESSMENT — ENCOUNTER SYMPTOMS
ALLERGIC/IMMUNOLOGIC COMMENTS: NO NEW FOOD ALLERGIES
CHILLS: 0
WEIGHT LOSS: 0
HEMATOCHEZIA: 0
HEMOPTYSIS: 0
FEVER: 0
WEIGHT GAIN: 0
COUGH: 0
BRUISES/BLEEDS EASILY: 0
SUSPICIOUS LESIONS: 0

## 2020-11-15 ENCOUNTER — HOSPITAL ENCOUNTER (EMERGENCY)
Facility: HOSPITAL | Age: 85
Discharge: HOME | End: 2020-11-15
Attending: EMERGENCY MEDICINE
Payer: MEDICARE

## 2020-11-15 VITALS
OXYGEN SATURATION: 96 % | DIASTOLIC BLOOD PRESSURE: 72 MMHG | BODY MASS INDEX: 24.48 KG/M2 | HEART RATE: 65 BPM | SYSTOLIC BLOOD PRESSURE: 165 MMHG | HEIGHT: 67 IN | RESPIRATION RATE: 18 BRPM | WEIGHT: 156 LBS | TEMPERATURE: 98 F

## 2020-11-15 DIAGNOSIS — R33.9 URINARY RETENTION: Primary | ICD-10-CM

## 2020-11-15 PROCEDURE — 87086 URINE CULTURE/COLONY COUNT: CPT | Performed by: EMERGENCY MEDICINE

## 2020-11-15 PROCEDURE — 80053 COMPREHEN METABOLIC PANEL: CPT | Performed by: EMERGENCY MEDICINE

## 2020-11-15 PROCEDURE — 81001 URINALYSIS AUTO W/SCOPE: CPT | Performed by: EMERGENCY MEDICINE

## 2020-11-15 PROCEDURE — 85025 COMPLETE CBC W/AUTO DIFF WBC: CPT | Performed by: EMERGENCY MEDICINE

## 2020-11-15 PROCEDURE — 99283 EMERGENCY DEPT VISIT LOW MDM: CPT

## 2020-11-15 PROCEDURE — 36415 COLL VENOUS BLD VENIPUNCTURE: CPT

## 2020-11-15 RX ORDER — MAGNESIUM HYDROXIDE 1200 MG/15ML
3000 LIQUID ORAL CONTINUOUS
Status: CANCELLED | OUTPATIENT
Start: 2020-11-15

## 2020-11-15 RX ORDER — LIDOCAINE HYDROCHLORIDE 20 MG/ML
10 JELLY TOPICAL ONCE
Status: COMPLETED | OUTPATIENT
Start: 2020-11-15 | End: 2020-11-15

## 2020-11-15 RX ORDER — LIDOCAINE HYDROCHLORIDE 20 MG/ML
JELLY TOPICAL
Status: COMPLETED
Start: 2020-11-15 | End: 2020-11-15

## 2020-11-15 NOTE — ED INITIAL ASSESSMENT (HPI)
Pt presents to ER with blood in urine. pts daughter does intermittent straight caths after pt had a scope on Friday. Daughter states that caths were going well until today when she noted a large blood clot and resistance while attempting to do catheter.  Pt

## 2020-11-15 NOTE — ED PROVIDER NOTES
Patient Seen in: [facility] Emergency Department      History   Patient presents with:  Urinary Symptoms    Stated Complaint: Urinary retention, blood clots     HPI    80year-old with a history of chronic renal insufficiency, macular degeneration, SURGICAL HISTORY      [facility]   • OTHER SURGICAL HISTORY  2020    Cystoscopy Dr. Ashley Sin History    Tobacco Use      Smoking status: Former Smoker        Quit date: 3/3/2002        Years since quittin.7 components:    Glucose Urine 50  (*)     Blood Urine Small (*)     Protein Urine 30  (*)     WBC Urine 5-10 (*)     RBC URINE >10 (*)     Bacteria Urine Rare (*)     Hyaline Casts Present (*)     All other components within normal limits   CBC W/ DIFFERENT clear with irrigation. Therefore irrigation discontinued. MDM      Since daughter would like to continue with intermittent straight cath at home which I feel is reasonable.   Creatinine is mildly bumped, I explained this to his daughter he should have t

## 2020-11-17 ENCOUNTER — TELEPHONE (OUTPATIENT)
Dept: FAMILY MEDICINE CLINIC | Facility: CLINIC | Age: 85
End: 2020-11-17

## 2020-11-17 DIAGNOSIS — E11.22 TYPE 2 DIABETES MELLITUS WITH ESRD (END-STAGE RENAL DISEASE) (HCC): ICD-10-CM

## 2020-11-17 DIAGNOSIS — N13.8 HYPERTROPHY OF PROSTATE WITH URINARY OBSTRUCTION: ICD-10-CM

## 2020-11-17 DIAGNOSIS — J44.9 OBSTRUCTIVE CHRONIC BRONCHITIS WITHOUT EXACERBATION (HCC): Primary | ICD-10-CM

## 2020-11-17 DIAGNOSIS — H54.8 LEGAL BLINDNESS, AS DEFINED IN USA: ICD-10-CM

## 2020-11-17 DIAGNOSIS — N18.6 TYPE 2 DIABETES MELLITUS WITH ESRD (END-STAGE RENAL DISEASE) (HCC): ICD-10-CM

## 2020-11-17 DIAGNOSIS — N40.1 HYPERTROPHY OF PROSTATE WITH URINARY OBSTRUCTION: ICD-10-CM

## 2020-11-17 DIAGNOSIS — E11.8 DIABETIC COMPLICATION (HCC): ICD-10-CM

## 2020-11-17 NOTE — TELEPHONE ENCOUNTER
Daughter notified we have faxed over order to [facility] for nurse to evaluate, home health aide and palliative care with [facility].

## 2020-11-17 NOTE — TELEPHONE ENCOUNTER
Referral request [facility]    Nurse to evaluate and treat  [address] care with [facility]    8 visits    Office notes from Dr. Gabby Browne M.D. 11/6/2020  Patient Active Problem List:     Mixed hyperlipidemia     GERD

## 2020-11-18 DIAGNOSIS — I11.0 HYPERTENSIVE HEART DISEASE WITH DIASTOLIC HEART FAILURE (HCC): Chronic | ICD-10-CM

## 2020-11-18 DIAGNOSIS — I10 ESSENTIAL HYPERTENSION: Chronic | ICD-10-CM

## 2020-11-18 DIAGNOSIS — I50.30 HYPERTENSIVE HEART DISEASE WITH DIASTOLIC HEART FAILURE (HCC): Chronic | ICD-10-CM

## 2020-11-23 ENCOUNTER — TELEPHONE (OUTPATIENT)
Dept: FAMILY MEDICINE CLINIC | Facility: CLINIC | Age: 85
End: 2020-11-23

## 2020-11-23 NOTE — TELEPHONE ENCOUNTER
Please call [facility] back and advise order were placed through clipkit by Dr Omar Pritchard on 11/17/2020

## 2020-11-23 NOTE — TELEPHONE ENCOUNTER
Spoke to Jose Dhaliwal @ [facility]. She's requesting home health orders for this pt.

## 2020-11-25 RX ORDER — METOPROLOL TARTRATE 100 MG/1
TABLET ORAL
Qty: 90 TABLET | Refills: 3 | OUTPATIENT
Start: 2020-11-25

## 2020-11-25 NOTE — TELEPHONE ENCOUNTER
METOPROLOL TARTRATE 100 MG TAB     Sig: TAKE 1 TABLET BY MOUTH EVERY DAY    Disp:  90 tablet    Refills:  3    Start: 11/18/2020    Class: Normal    Non-formulary For: Hypertensive heart disease with diastolic heart failure ([facility] [address] Essential hypertension

## 2021-01-01 ENCOUNTER — TELEMEDICINE (OUTPATIENT)
Dept: FAMILY MEDICINE CLINIC | Facility: CLINIC | Age: 86
End: 2021-01-01

## 2021-01-01 ENCOUNTER — TELEPHONE (OUTPATIENT)
Dept: FAMILY MEDICINE CLINIC | Facility: CLINIC | Age: 86
End: 2021-01-01

## 2021-01-01 ENCOUNTER — PATIENT OUTREACH (OUTPATIENT)
Dept: CASE MANAGEMENT | Age: 86
End: 2021-01-01

## 2021-01-01 ENCOUNTER — PATIENT MESSAGE (OUTPATIENT)
Dept: FAMILY MEDICINE CLINIC | Facility: CLINIC | Age: 86
End: 2021-01-01

## 2021-01-01 ENCOUNTER — TELEPHONE (OUTPATIENT)
Dept: CASE MANAGEMENT | Age: 86
End: 2021-01-01

## 2021-01-01 ENCOUNTER — IMMUNIZATION (OUTPATIENT)
Dept: LAB | Age: 86
End: 2021-01-01
Attending: HOSPITALIST
Payer: MEDICARE

## 2021-01-01 ENCOUNTER — LAB REQUISITION (OUTPATIENT)
Dept: LAB | Facility: HOSPITAL | Age: 86
End: 2021-01-01
Payer: MEDICARE

## 2021-01-01 ENCOUNTER — HOSPITAL ENCOUNTER (OUTPATIENT)
Facility: HOSPITAL | Age: 86
Setting detail: OBSERVATION
Discharge: HOME HEALTH | End: 2021-01-01
Attending: EMERGENCY MEDICINE | Admitting: HOSPITALIST
Payer: MEDICARE

## 2021-01-01 ENCOUNTER — APPOINTMENT (OUTPATIENT)
Dept: CT IMAGING | Facility: HOSPITAL | Age: 86
End: 2021-01-01
Attending: EMERGENCY MEDICINE
Payer: MEDICARE

## 2021-01-01 VITALS
BODY MASS INDEX: 19.01 KG/M2 | WEIGHT: 125.44 LBS | HEART RATE: 70 BPM | TEMPERATURE: 99 F | RESPIRATION RATE: 18 BRPM | DIASTOLIC BLOOD PRESSURE: 69 MMHG | HEIGHT: 68 IN | OXYGEN SATURATION: 95 % | SYSTOLIC BLOOD PRESSURE: 166 MMHG

## 2021-01-01 DIAGNOSIS — Z46.6 ENCOUNTER FOR FITTING AND ADJUSTMENT OF URINARY DEVICE: ICD-10-CM

## 2021-01-01 DIAGNOSIS — N40.1 BPH WITH OBSTRUCTION/LOWER URINARY TRACT SYMPTOMS: Chronic | ICD-10-CM

## 2021-01-01 DIAGNOSIS — I10 ESSENTIAL HYPERTENSION: Chronic | ICD-10-CM

## 2021-01-01 DIAGNOSIS — H43.813 POSTERIOR VITREOUS DETACHMENT OF BOTH EYES: ICD-10-CM

## 2021-01-01 DIAGNOSIS — I10 ESSENTIAL HYPERTENSION: Primary | ICD-10-CM

## 2021-01-01 DIAGNOSIS — J44.9 CHRONIC OBSTRUCTIVE PULMONARY DISEASE, UNSPECIFIED COPD TYPE (HCC): ICD-10-CM

## 2021-01-01 DIAGNOSIS — M17.11 PRIMARY OSTEOARTHRITIS OF RIGHT KNEE: ICD-10-CM

## 2021-01-01 DIAGNOSIS — E11.22 CKD STAGE 4 DUE TO TYPE 2 DIABETES MELLITUS (HCC): ICD-10-CM

## 2021-01-01 DIAGNOSIS — R33.9 CHRONIC RETENTION OF URINE: ICD-10-CM

## 2021-01-01 DIAGNOSIS — E11.8 DIABETES MELLITUS TYPE 2 WITH COMPLICATIONS (HCC): ICD-10-CM

## 2021-01-01 DIAGNOSIS — K21.9 GASTROESOPHAGEAL REFLUX DISEASE WITHOUT ESOPHAGITIS: Chronic | ICD-10-CM

## 2021-01-01 DIAGNOSIS — Z23 NEED FOR VACCINATION: Primary | ICD-10-CM

## 2021-01-01 DIAGNOSIS — E11.8 TYPE 2 DIABETES MELLITUS WITH COMPLICATION, WITHOUT LONG-TERM CURRENT USE OF INSULIN (HCC): ICD-10-CM

## 2021-01-01 DIAGNOSIS — H35.3230 BILATERAL EXUDATIVE AGE-RELATED MACULAR DEGENERATION, UNSPECIFIED STAGE (HCC): Chronic | ICD-10-CM

## 2021-01-01 DIAGNOSIS — N18.4 CKD STAGE 4 DUE TO TYPE 2 DIABETES MELLITUS (HCC): ICD-10-CM

## 2021-01-01 DIAGNOSIS — I11.0 HYPERTENSIVE HEART DISEASE WITH DIASTOLIC HEART FAILURE (HCC): ICD-10-CM

## 2021-01-01 DIAGNOSIS — N13.8 BPH WITH OBSTRUCTION/LOWER URINARY TRACT SYMPTOMS: Chronic | ICD-10-CM

## 2021-01-01 DIAGNOSIS — E78.2 MIXED HYPERLIPIDEMIA: ICD-10-CM

## 2021-01-01 DIAGNOSIS — I70.0 ATHEROSCLEROSIS OF AORTA (HCC): ICD-10-CM

## 2021-01-01 DIAGNOSIS — I50.30 HYPERTENSIVE HEART DISEASE WITH DIASTOLIC HEART FAILURE (HCC): Chronic | ICD-10-CM

## 2021-01-01 DIAGNOSIS — E11.42 DIABETIC POLYNEUROPATHY ASSOCIATED WITH TYPE 2 DIABETES MELLITUS (HCC): ICD-10-CM

## 2021-01-01 DIAGNOSIS — R53.1 WEAKNESS GENERALIZED: ICD-10-CM

## 2021-01-01 DIAGNOSIS — H54.8 LEGALLY BLIND: ICD-10-CM

## 2021-01-01 DIAGNOSIS — H35.3230 BILATERAL EXUDATIVE AGE-RELATED MACULAR DEGENERATION, UNSPECIFIED STAGE (HCC): ICD-10-CM

## 2021-01-01 DIAGNOSIS — K21.9 GASTROESOPHAGEAL REFLUX DISEASE WITHOUT ESOPHAGITIS: ICD-10-CM

## 2021-01-01 DIAGNOSIS — Z23 NEED FOR VACCINATION: ICD-10-CM

## 2021-01-01 DIAGNOSIS — N40.1 BENIGN PROSTATIC HYPERPLASIA WITH LOWER URINARY TRACT SYMPTOMS: ICD-10-CM

## 2021-01-01 DIAGNOSIS — E11.22 CKD STAGE 4 DUE TO TYPE 2 DIABETES MELLITUS (HCC): Primary | ICD-10-CM

## 2021-01-01 DIAGNOSIS — I50.30 HYPERTENSIVE HEART DISEASE WITH DIASTOLIC HEART FAILURE (HCC): Primary | Chronic | ICD-10-CM

## 2021-01-01 DIAGNOSIS — I65.23 BILATERAL CAROTID ARTERY STENOSIS: ICD-10-CM

## 2021-01-01 DIAGNOSIS — N40.1 BPH WITH URINARY OBSTRUCTION: ICD-10-CM

## 2021-01-01 DIAGNOSIS — R19.7 DIARRHEA OF PRESUMED INFECTIOUS ORIGIN: ICD-10-CM

## 2021-01-01 DIAGNOSIS — I11.0 HYPERTENSIVE HEART DISEASE WITH DIASTOLIC HEART FAILURE (HCC): Chronic | ICD-10-CM

## 2021-01-01 DIAGNOSIS — N13.8 BPH WITH URINARY OBSTRUCTION: ICD-10-CM

## 2021-01-01 DIAGNOSIS — N18.4 CKD STAGE 4 DUE TO TYPE 2 DIABETES MELLITUS (HCC): Primary | ICD-10-CM

## 2021-01-01 DIAGNOSIS — Z02.9 ENCOUNTERS FOR ADMINISTRATIVE PURPOSE: ICD-10-CM

## 2021-01-01 DIAGNOSIS — I50.30 HYPERTENSIVE HEART DISEASE WITH DIASTOLIC HEART FAILURE (HCC): ICD-10-CM

## 2021-01-01 DIAGNOSIS — N40.1 BPH WITH OBSTRUCTION/LOWER URINARY TRACT SYMPTOMS: ICD-10-CM

## 2021-01-01 DIAGNOSIS — R11.2 NAUSEA AND VOMITING IN ADULT: Primary | ICD-10-CM

## 2021-01-01 DIAGNOSIS — I11.0 HYPERTENSIVE HEART DISEASE WITH DIASTOLIC HEART FAILURE (HCC): Primary | Chronic | ICD-10-CM

## 2021-01-01 DIAGNOSIS — J44.9 OBSTRUCTIVE CHRONIC BRONCHITIS WITHOUT EXACERBATION (HCC): ICD-10-CM

## 2021-01-01 DIAGNOSIS — R19.7 DIARRHEA OF PRESUMED INFECTIOUS ORIGIN: Primary | ICD-10-CM

## 2021-01-01 DIAGNOSIS — E11.8 DM (DIABETES MELLITUS), TYPE 2 WITH COMPLICATIONS (HCC): ICD-10-CM

## 2021-01-01 DIAGNOSIS — N13.8 BPH WITH OBSTRUCTION/LOWER URINARY TRACT SYMPTOMS: ICD-10-CM

## 2021-01-01 DIAGNOSIS — I10 ESSENTIAL HYPERTENSION: ICD-10-CM

## 2021-01-01 LAB
ALBUMIN SERPL-MCNC: 3.1 G/DL (ref 3.4–5)
ALBUMIN SERPL-MCNC: 3.3 G/DL (ref 3.4–5)
ALBUMIN/GLOB SERPL: 0.8 {RATIO} (ref 1–2)
ALBUMIN/GLOB SERPL: 0.8 {RATIO} (ref 1–2)
ALP LIVER SERPL-CCNC: 96 U/L
ALP LIVER SERPL-CCNC: 98 U/L
ALT SERPL-CCNC: 12 U/L
ALT SERPL-CCNC: 14 U/L
ANION GAP SERPL CALC-SCNC: 8 MMOL/L (ref 0–18)
AST SERPL-CCNC: 14 U/L (ref 15–37)
AST SERPL-CCNC: 21 U/L (ref 15–37)
ATRIAL RATE: 79 BPM
BASOPHILS # BLD AUTO: 0.03 X10(3) UL (ref 0–0.2)
BASOPHILS # BLD AUTO: 0.03 X10(3) UL (ref 0–0.2)
BASOPHILS # BLD AUTO: 0.11 X10(3) UL (ref 0–0.2)
BASOPHILS NFR BLD AUTO: 0.2 %
BASOPHILS NFR BLD AUTO: 0.3 %
BASOPHILS NFR BLD AUTO: 1.2 %
BILIRUB SERPL-MCNC: 0.6 MG/DL (ref 0.1–2)
BILIRUB SERPL-MCNC: 0.8 MG/DL (ref 0.1–2)
BILIRUB UR QL STRIP.AUTO: NEGATIVE
BILIRUB UR QL STRIP.AUTO: NEGATIVE
BUN BLD-MCNC: 19 MG/DL (ref 7–18)
BUN BLD-MCNC: 27 MG/DL (ref 7–18)
BUN BLD-MCNC: 28 MG/DL (ref 7–18)
BUN/CREAT SERPL: 14.8 (ref 10–20)
BUN/CREAT SERPL: 15.1 (ref 10–20)
CALCIUM BLD-MCNC: 8.3 MG/DL (ref 8.5–10.1)
CALCIUM BLD-MCNC: 8.3 MG/DL (ref 8.5–10.1)
CALCIUM BLD-MCNC: 8.9 MG/DL (ref 8.5–10.1)
CHLORIDE SERPL-SCNC: 100 MMOL/L (ref 98–112)
CHLORIDE SERPL-SCNC: 106 MMOL/L (ref 98–112)
CHLORIDE SERPL-SCNC: 110 MMOL/L (ref 98–112)
CO2 SERPL-SCNC: 22 MMOL/L (ref 21–32)
CO2 SERPL-SCNC: 22 MMOL/L (ref 21–32)
CO2 SERPL-SCNC: 23 MMOL/L (ref 21–32)
COLOR UR AUTO: YELLOW
CREAT BLD-MCNC: 1.74 MG/DL
CREAT BLD-MCNC: 1.82 MG/DL
CREAT BLD-MCNC: 1.86 MG/DL
DEPRECATED RDW RBC AUTO: 43.4 FL (ref 35.1–46.3)
DEPRECATED RDW RBC AUTO: 43.8 FL (ref 35.1–46.3)
EOSINOPHIL # BLD AUTO: 0.08 X10(3) UL (ref 0–0.7)
EOSINOPHIL # BLD AUTO: 0.55 X10(3) UL (ref 0–0.7)
EOSINOPHIL # BLD AUTO: 0.8 X10(3) UL (ref 0–0.7)
EOSINOPHIL NFR BLD AUTO: 0.8 %
EOSINOPHIL NFR BLD AUTO: 4 %
EOSINOPHIL NFR BLD AUTO: 9 %
ERYTHROCYTE [DISTWIDTH] IN BLOOD BY AUTOMATED COUNT: 12.4 %
ERYTHROCYTE [DISTWIDTH] IN BLOOD BY AUTOMATED COUNT: 12.7 % (ref 11–15)
ERYTHROCYTE [DISTWIDTH] IN BLOOD BY AUTOMATED COUNT: 12.7 % (ref 11–15)
EST. AVERAGE GLUCOSE BLD GHB EST-MCNC: 140 MG/DL (ref 68–126)
EST. AVERAGE GLUCOSE BLD GHB EST-MCNC: 154 MG/DL (ref 68–126)
GLOBULIN PLAS-MCNC: 3.9 G/DL (ref 2.8–4.4)
GLOBULIN PLAS-MCNC: 4 G/DL (ref 2.8–4.4)
GLUCOSE BLD-MCNC: 123 MG/DL (ref 70–99)
GLUCOSE BLD-MCNC: 124 MG/DL (ref 70–99)
GLUCOSE BLD-MCNC: 129 MG/DL (ref 70–99)
GLUCOSE BLD-MCNC: 154 MG/DL (ref 70–99)
GLUCOSE BLD-MCNC: 155 MG/DL (ref 70–99)
GLUCOSE BLD-MCNC: 164 MG/DL (ref 70–99)
GLUCOSE BLD-MCNC: 86 MG/DL (ref 70–99)
GLUCOSE BLD-MCNC: 98 MG/DL (ref 70–99)
GLUCOSE UR STRIP.AUTO-MCNC: 150 MG/DL
GLUCOSE UR STRIP.AUTO-MCNC: NEGATIVE MG/DL
HBA1C MFR BLD HPLC: 6.5 % (ref ?–5.7)
HBA1C MFR BLD HPLC: 7 % (ref ?–5.7)
HCT VFR BLD AUTO: 32.1 %
HCT VFR BLD AUTO: 36.3 %
HCT VFR BLD AUTO: 38.6 %
HGB BLD-MCNC: 10.5 G/DL
HGB BLD-MCNC: 12.2 G/DL
HGB BLD-MCNC: 12.6 G/DL
IMM GRANULOCYTES # BLD AUTO: 0.03 X10(3) UL (ref 0–1)
IMM GRANULOCYTES # BLD AUTO: 0.04 X10(3) UL (ref 0–1)
IMM GRANULOCYTES # BLD AUTO: 0.05 X10(3) UL (ref 0–1)
IMM GRANULOCYTES NFR BLD: 0.3 %
IMM GRANULOCYTES NFR BLD: 0.4 %
IMM GRANULOCYTES NFR BLD: 0.4 %
KETONES UR STRIP.AUTO-MCNC: NEGATIVE MG/DL
KETONES UR STRIP.AUTO-MCNC: NEGATIVE MG/DL
LIPASE SERPL-CCNC: 175 U/L (ref 73–393)
LYMPHOCYTES # BLD AUTO: 1.63 X10(3) UL (ref 1–4)
LYMPHOCYTES # BLD AUTO: 2.07 X10(3) UL (ref 1–4)
LYMPHOCYTES # BLD AUTO: 2.82 X10(3) UL (ref 1–4)
LYMPHOCYTES NFR BLD AUTO: 11.9 %
LYMPHOCYTES NFR BLD AUTO: 19.8 %
LYMPHOCYTES NFR BLD AUTO: 31.6 %
M PROTEIN MFR SERPL ELPH: 7.1 G/DL (ref 6.4–8.2)
M PROTEIN MFR SERPL ELPH: 7.2 G/DL (ref 6.4–8.2)
MCH RBC QN AUTO: 31 PG (ref 26–34)
MCH RBC QN AUTO: 31.1 PG (ref 26–34)
MCH RBC QN AUTO: 31.6 PG (ref 26–34)
MCHC RBC AUTO-ENTMCNC: 32.6 G/DL (ref 31–37)
MCHC RBC AUTO-ENTMCNC: 32.7 G/DL (ref 31–37)
MCHC RBC AUTO-ENTMCNC: 33.6 G/DL (ref 31–37)
MCV RBC AUTO: 94 FL
MCV RBC AUTO: 94.7 FL
MCV RBC AUTO: 95.3 FL
MONOCYTES # BLD AUTO: 0.75 X10(3) UL (ref 0.1–1)
MONOCYTES # BLD AUTO: 0.99 X10(3) UL (ref 0.1–1)
MONOCYTES # BLD AUTO: 1.59 X10(3) UL (ref 0.1–1)
MONOCYTES NFR BLD AUTO: 11.1 %
MONOCYTES NFR BLD AUTO: 11.6 %
MONOCYTES NFR BLD AUTO: 7.2 %
NEUTROPHILS # BLD AUTO: 4.17 X10 (3) UL (ref 1.5–7.7)
NEUTROPHILS # BLD AUTO: 4.17 X10(3) UL (ref 1.5–7.7)
NEUTROPHILS # BLD AUTO: 7.46 X10 (3) UL (ref 1.5–7.7)
NEUTROPHILS # BLD AUTO: 7.46 X10(3) UL (ref 1.5–7.7)
NEUTROPHILS # BLD AUTO: 9.82 X10 (3) UL (ref 1.5–7.7)
NEUTROPHILS # BLD AUTO: 9.82 X10(3) UL (ref 1.5–7.7)
NEUTROPHILS NFR BLD AUTO: 46.8 %
NEUTROPHILS NFR BLD AUTO: 71.5 %
NEUTROPHILS NFR BLD AUTO: 71.9 %
NITRITE UR QL STRIP.AUTO: NEGATIVE
NITRITE UR QL STRIP.AUTO: NEGATIVE
OSMOLALITY SERPL CALC.SUM OF ELEC: 274 MOSM/KG (ref 275–295)
OSMOLALITY SERPL CALC.SUM OF ELEC: 291 MOSM/KG (ref 275–295)
OSMOLALITY SERPL CALC.SUM OF ELEC: 297 MOSM/KG (ref 275–295)
P AXIS: 48 DEGREES
P-R INTERVAL: 162 MS
PH UR STRIP.AUTO: 6 [PH] (ref 5–8)
PH UR STRIP.AUTO: 7 [PH] (ref 4.5–8)
PLATELET # BLD AUTO: 192 10(3)UL (ref 150–450)
PLATELET # BLD AUTO: 230 10(3)UL (ref 150–450)
PLATELET # BLD AUTO: 246 10(3)UL (ref 150–450)
POTASSIUM SERPL-SCNC: 3.8 MMOL/L (ref 3.5–5.1)
POTASSIUM SERPL-SCNC: 3.9 MMOL/L (ref 3.5–5.1)
POTASSIUM SERPL-SCNC: 4.6 MMOL/L (ref 3.5–5.1)
PROT UR STRIP.AUTO-MCNC: 100 MG/DL
PROT UR STRIP.AUTO-MCNC: >=500 MG/DL
Q-T INTERVAL: 458 MS
QRS DURATION: 128 MS
QTC CALCULATION (BEZET): 525 MS
R AXIS: -39 DEGREES
RBC # BLD AUTO: 3.39 X10(6)UL
RBC # BLD AUTO: 3.86 X10(6)UL
RBC # BLD AUTO: 4.05 X10(6)UL
RBC #/AREA URNS AUTO: >10 /HPF
RBC UR QL AUTO: NEGATIVE
SARS-COV-2 RNA RESP QL NAA+PROBE: NOT DETECTED
SODIUM SERPL-SCNC: 131 MMOL/L (ref 136–145)
SODIUM SERPL-SCNC: 136 MMOL/L (ref 136–145)
SODIUM SERPL-SCNC: 140 MMOL/L (ref 136–145)
SP GR UR STRIP.AUTO: 1.02 (ref 1–1.03)
SP GR UR STRIP.AUTO: 1.02 (ref 1–1.03)
T AXIS: 118 DEGREES
TROPONIN I SERPL-MCNC: <0.045 NG/ML (ref ?–0.04)
UROBILINOGEN UR STRIP.AUTO-MCNC: <2 MG/DL
UROBILINOGEN UR STRIP.AUTO-MCNC: <2 MG/DL
VENTRICULAR RATE: 79 BPM
WBC # BLD AUTO: 10.4 X10(3) UL (ref 4–11)
WBC # BLD AUTO: 13.7 X10(3) UL (ref 4–11)
WBC # BLD AUTO: 8.9 X10(3) UL (ref 4–11)
WBC #/AREA URNS AUTO: >50 /HPF
WBC #/AREA URNS AUTO: >50 /HPF
WBC CLUMPS UR QL AUTO: PRESENT /HPF

## 2021-01-01 PROCEDURE — 99490 CHRNC CARE MGMT STAFF 1ST 20: CPT

## 2021-01-01 PROCEDURE — 99439 CHRNC CARE MGMT STAF EA ADDL: CPT

## 2021-01-01 PROCEDURE — 0001A SARSCOV2 VAC 30MCG/0.3ML IM: CPT

## 2021-01-01 PROCEDURE — 1111F DSCHRG MED/CURRENT MED MERGE: CPT

## 2021-01-01 PROCEDURE — 87086 URINE CULTURE/COLONY COUNT: CPT | Performed by: UROLOGY

## 2021-01-01 PROCEDURE — 87186 SC STD MICRODIL/AGAR DIL: CPT | Performed by: UROLOGY

## 2021-01-01 PROCEDURE — 80053 COMPREHEN METABOLIC PANEL: CPT | Performed by: FAMILY MEDICINE

## 2021-01-01 PROCEDURE — 0002A SARSCOV2 VAC 30MCG/0.3ML IM: CPT

## 2021-01-01 PROCEDURE — 87077 CULTURE AEROBIC IDENTIFY: CPT | Performed by: UROLOGY

## 2021-01-01 PROCEDURE — 99495 TRANSJ CARE MGMT MOD F2F 14D: CPT | Performed by: FAMILY MEDICINE

## 2021-01-01 PROCEDURE — 85025 COMPLETE CBC W/AUTO DIFF WBC: CPT | Performed by: FAMILY MEDICINE

## 2021-01-01 PROCEDURE — 99217 OBSERVATION CARE DISCHARGE: CPT | Performed by: INTERNAL MEDICINE

## 2021-01-01 PROCEDURE — 74176 CT ABD & PELVIS W/O CONTRAST: CPT | Performed by: EMERGENCY MEDICINE

## 2021-01-01 PROCEDURE — 81001 URINALYSIS AUTO W/SCOPE: CPT | Performed by: UROLOGY

## 2021-01-01 PROCEDURE — 83036 HEMOGLOBIN GLYCOSYLATED A1C: CPT | Performed by: FAMILY MEDICINE

## 2021-01-01 PROCEDURE — 99220 INITIAL OBSERVATION CARE,LEVL III: CPT | Performed by: HOSPITALIST

## 2021-01-01 RX ORDER — SULFAMETHOXAZOLE AND TRIMETHOPRIM 800; 160 MG/1; MG/1
1 TABLET ORAL 2 TIMES DAILY
COMMUNITY
End: 2021-01-01

## 2021-01-01 RX ORDER — ACETAMINOPHEN 325 MG/1
650 TABLET ORAL EVERY 6 HOURS PRN
Status: DISCONTINUED | OUTPATIENT
Start: 2021-01-01 | End: 2021-01-01

## 2021-01-01 RX ORDER — LOSARTAN POTASSIUM 50 MG/1
50 TABLET ORAL DAILY
Status: DISCONTINUED | OUTPATIENT
Start: 2021-01-01 | End: 2021-01-01

## 2021-01-01 RX ORDER — IPRATROPIUM BROMIDE AND ALBUTEROL SULFATE 2.5; .5 MG/3ML; MG/3ML
3 SOLUTION RESPIRATORY (INHALATION) EVERY 6 HOURS PRN
Status: DISCONTINUED | OUTPATIENT
Start: 2021-01-01 | End: 2021-01-01

## 2021-01-01 RX ORDER — SODIUM CHLORIDE 9 MG/ML
INJECTION, SOLUTION INTRAVENOUS CONTINUOUS
Status: DISCONTINUED | OUTPATIENT
Start: 2021-01-01 | End: 2021-01-01

## 2021-01-01 RX ORDER — PANTOPRAZOLE SODIUM 40 MG/1
TABLET, DELAYED RELEASE ORAL
Qty: 90 TABLET | Refills: 0 | OUTPATIENT
Start: 2021-01-01

## 2021-01-01 RX ORDER — METOCLOPRAMIDE HYDROCHLORIDE 5 MG/ML
5 INJECTION INTRAMUSCULAR; INTRAVENOUS EVERY 8 HOURS PRN
Status: DISCONTINUED | OUTPATIENT
Start: 2021-01-01 | End: 2021-01-01

## 2021-01-01 RX ORDER — ONDANSETRON 2 MG/ML
4 INJECTION INTRAMUSCULAR; INTRAVENOUS ONCE
Status: COMPLETED | OUTPATIENT
Start: 2021-01-01 | End: 2021-01-01

## 2021-01-01 RX ORDER — DEXTROSE MONOHYDRATE 25 G/50ML
50 INJECTION, SOLUTION INTRAVENOUS
Status: DISCONTINUED | OUTPATIENT
Start: 2021-01-01 | End: 2021-01-01

## 2021-01-01 RX ORDER — PANTOPRAZOLE SODIUM 40 MG/1
40 TABLET, DELAYED RELEASE ORAL
Status: DISCONTINUED | OUTPATIENT
Start: 2021-01-01 | End: 2021-01-01

## 2021-01-01 RX ORDER — GLYBURIDE 2.5 MG/1
2.5 TABLET ORAL
Qty: 90 TABLET | Refills: 11 | Status: SHIPPED | OUTPATIENT
Start: 2021-01-01 | End: 2022-10-07

## 2021-01-01 RX ORDER — ASPIRIN 325 MG
325 TABLET, DELAYED RELEASE (ENTERIC COATED) ORAL DAILY
Status: DISCONTINUED | OUTPATIENT
Start: 2021-01-01 | End: 2021-01-01

## 2021-01-01 RX ORDER — ONDANSETRON 4 MG/1
4 TABLET, FILM COATED ORAL EVERY 8 HOURS PRN
Qty: 15 TABLET | Refills: 0 | Status: SHIPPED | OUTPATIENT
Start: 2021-01-01 | End: 2021-01-01

## 2021-01-01 RX ORDER — HEPARIN SODIUM 5000 [USP'U]/ML
5000 INJECTION, SOLUTION INTRAVENOUS; SUBCUTANEOUS EVERY 8 HOURS SCHEDULED
Status: DISCONTINUED | OUTPATIENT
Start: 2021-01-01 | End: 2021-01-01

## 2021-01-01 RX ORDER — PANTOPRAZOLE SODIUM 40 MG/1
40 TABLET, DELAYED RELEASE ORAL
Qty: 90 TABLET | Refills: 11 | Status: SHIPPED | OUTPATIENT
Start: 2021-01-01

## 2021-01-01 RX ORDER — METOPROLOL TARTRATE 100 MG/1
100 TABLET ORAL DAILY
Qty: 90 TABLET | Refills: 11 | Status: SHIPPED | OUTPATIENT
Start: 2021-01-01 | End: 2022-10-02

## 2021-01-01 RX ORDER — HYDRALAZINE HYDROCHLORIDE 25 MG/1
25 TABLET, FILM COATED ORAL 2 TIMES DAILY
Status: DISCONTINUED | OUTPATIENT
Start: 2021-01-01 | End: 2021-01-01

## 2021-01-01 RX ORDER — ALBUTEROL SULFATE 90 UG/1
2 POWDER, METERED RESPIRATORY (INHALATION) EVERY 4 HOURS PRN
Qty: 3 EACH | Refills: 11 | Status: SHIPPED | OUTPATIENT
Start: 2021-01-01

## 2021-01-01 RX ORDER — HYDRALAZINE HYDROCHLORIDE 25 MG/1
25 TABLET, FILM COATED ORAL 2 TIMES DAILY
Qty: 180 TABLET | Refills: 11 | Status: SHIPPED | OUTPATIENT
Start: 2021-01-01

## 2021-01-01 RX ORDER — LOSARTAN POTASSIUM 50 MG/1
50 TABLET ORAL DAILY
Qty: 90 TABLET | Refills: 11 | Status: SHIPPED | OUTPATIENT
Start: 2021-01-01

## 2021-01-01 RX ORDER — METOPROLOL TARTRATE 100 MG/1
100 TABLET ORAL
Status: DISCONTINUED | OUTPATIENT
Start: 2021-01-01 | End: 2021-01-01

## 2021-01-01 RX ORDER — CIPROFLOXACIN 2 MG/ML
400 INJECTION, SOLUTION INTRAVENOUS EVERY 24 HOURS
Status: DISCONTINUED | OUTPATIENT
Start: 2021-01-01 | End: 2021-01-01

## 2021-01-01 RX ORDER — ONDANSETRON 2 MG/ML
4 INJECTION INTRAMUSCULAR; INTRAVENOUS EVERY 6 HOURS PRN
Status: DISCONTINUED | OUTPATIENT
Start: 2021-01-01 | End: 2021-01-01

## 2021-01-01 RX ORDER — ATORVASTATIN CALCIUM 20 MG/1
20 TABLET, FILM COATED ORAL DAILY
Qty: 90 TABLET | Refills: 11 | Status: SHIPPED | OUTPATIENT
Start: 2021-01-01

## 2021-01-01 RX ORDER — ATORVASTATIN CALCIUM 20 MG/1
20 TABLET, FILM COATED ORAL DAILY
Status: DISCONTINUED | OUTPATIENT
Start: 2021-01-01 | End: 2021-01-01

## 2021-01-01 RX ORDER — ONDANSETRON 4 MG/1
4 TABLET, FILM COATED ORAL EVERY 8 HOURS PRN
Qty: 15 TABLET | Refills: 11 | Status: SHIPPED | OUTPATIENT
Start: 2021-01-01

## 2021-01-02 NOTE — TELEPHONE ENCOUNTER
From: Carmella Alves  To: Rod Atkins MD  Sent: 12/31/2020 2:03 PM CST  Subject: Other    [facility]  JAIMEEI the  from [facility] visited today, he stated threat the Department of Aging will not send out s

## 2021-01-03 NOTE — TELEPHONE ENCOUNTER
[id number] María Elena Atkinson for [facility] for this.  Thanks and stay well, Angela Nicholson MD

## 2021-01-04 NOTE — TELEPHONE ENCOUNTER
Patient's daughter notified that we have faxed over new order to [facility]

## 2021-01-04 NOTE — TELEPHONE ENCOUNTER
Referral request [facility]    Fax number: [phone]    Nurse to evaluate and treat  Home Health Aid for bathing    Eight visits    Patient seen by Dr. Spring Monroe M.D. 11/6/2020  Patient Active Problem List:     Mixed hyperlipidemia     GERD

## 2021-02-11 PROBLEM — R53.1 WEAKNESS GENERALIZED: Status: ACTIVE | Noted: 2021-01-01

## 2021-02-11 PROBLEM — R19.7 DIARRHEA OF PRESUMED INFECTIOUS ORIGIN: Status: ACTIVE | Noted: 2021-01-01

## 2021-02-11 PROBLEM — R11.2 NAUSEA AND VOMITING IN ADULT: Status: ACTIVE | Noted: 2021-01-01

## 2021-02-11 NOTE — ED PROVIDER NOTES
Patient Seen in: [facility] Emergency Department      History   Patient presents with:  Nausea/Vomiting/Diarrhea    Stated Complaint: N/V/D    HPI/Subjective:   HPI    75-year-old male past medical history of macular degeneration, blindness, BPH, [place]   • OTHER SURGICAL HISTORY  2020    Cystoscopy Dr. Sanid Koehler History    Tobacco Use      Smoking status: Former Smoker        Quit date: 3/3/2002        Years since quittin.9      Smokeless tobacco: Never Used    Alcohol following components:       Result Value    Glucose 155 (*)     BUN 28 (*)     Creatinine 1.86 (*)     Calcium, Total 8.3 (*)     GFR, Non- 31 (*)     GFR, -American 36 (*)     AST 14 (*)     ALT 12 (*)     Albumin 3.1 (*)     A/G Ra Moderate thickening of the bladder wall. MDM      This is a 61-year-old male who presents ED with complaints of nausea vomiting diarrhea.   Vital signs stable arrival patient appears generally weak on examination his abdomen is soft however nontende adult R11.2 2/11/2021 Unknown

## 2021-02-11 NOTE — ED INITIAL ASSESSMENT (HPI)
Arrives via EMS from  Home with +N/V/D started 2-3 hours PTA. 10 episodes of diarrhea, 3 vomiting episodes. Daughter also has same symptoms.

## 2021-02-11 NOTE — HOME CARE LIAISON
Pt current w/[facility] (RN, HHA). Will need HALEY upon discharge if pt status changes to inpt and returning with [facility].

## 2021-02-11 NOTE — PLAN OF CARE
NURSING ADMISSION NOTE      Patient admitted via Cart  Oriented to room. Safety precautions initiated. Bed in low position. Call light in reach. Patient received from ER. Patient a&ox4, [place], R hearing aid in place. Legally blind. Bed alarm on.  Ad

## 2021-02-11 NOTE — H&P
BE[facility] HOSPITALIST  History and Physical     Alla Sauceda Patient Status:  Observation    3/16/1929 MRN [id number]   [facility] 8NE-A Attending Jacqueline Leone, [address] Day # 0 PCP Noé Juarez MD     Chief Complaint:     Hist Allergies    Medications:  No current facility-administered medications on file prior to encounter. •  Metoprolol Tartrate 100 MG Oral Tab, Take 1 tablet (100 mg total) by mouth daily. , Disp: 90 tablet, Rfl: 3    •  hydrALAzine HCl 25 MG Oral Tab, Take point review of systems was completed. Pertinent positives and negatives noted in the HPI.     Physical Exam:    /74   Pulse 77   Temp 97.7 °F (36.5 °C) (Temporal)   Resp 15   Ht 5' 8\" (1.727 m)   Wt 151 lb (68.5 kg)   SpO2 96%   BMI 22.96 kg/m² factor insulin and hold oral meds. 4. GERD- Will continue PPi  5. Hypertension- Will metoprolol, losartan and hydralazine. 6. COPD- no active issues. Will order prn nebs. 7.   Hyperlipidemia- Will continue statin therapy.   8.   BPH- Pt has chronic gonzalez

## 2021-02-11 NOTE — PHYSICAL THERAPY NOTE
PHYSICAL THERAPY EVALUATION - INPATIENT     Room Number: 1342/3379-R  Evaluation Date: 2/11/2021  Type of Evaluation: Initial  Physician Order: PT Eval and Treat    Presenting Problem: nausea, diarrhea  Reason for Therapy: Mobility Dysfunction and [place] Owned Equipment: None       Prior Level of [place]: Pt reports does not use assistive device at home. Pt reports family assists as needed. SUBJECTIVE  \"I'm turning 89 next month. .. I'm going out with my lady friend! \"      Patient self-stated g received supine in bed, agreeable to PT. Pt demos supine to sit ind. Sit to/from stand with CGA. Pt reaching arms out for assist for guidance to bathroom 2/2 unable to see.   Pt assisted with placing hands on IV pole and able to assist with guiding into education;Gait training;Range of motion;Transfer training;Balance training  Rehab Potential : Fair  Frequency (Obs): (2-3x/week)  Number of Visits to Meet Established Goals: 2      CURRENT GOALS            Goal #3 Patient is able to ambulate 50 feet with a

## 2021-02-11 NOTE — OCCUPATIONAL THERAPY NOTE
OCCUPATIONAL THERAPY EVALUATION - INPATIENT     Room Number: 2481/3811-H  Evaluation Date: 2/11/2021  Type of Evaluation: Initial  Presenting Problem: nausea and vomiting    Physician Order: IP Consult to Occupational Therapy  Reason for Therapy: ADL/IADL PROSTATE N/A 3/3/2014    Performed by Yeni Cisse MD at [facility] MAIN OR   • OTHER SURGICAL HISTORY  1/2006    prostate surgery   • OTHER SURGICAL HISTORY      polys remove by [place]   • OTHER SURGICAL HISTORY  11/13/2020    Cystoscopy Dr. Monroe Smith O2 SATURATIONS                ACTIVITIES OF DAILY LIVING ASSESSMENT  AM-PAC ‘6-Clicks’ Inpatient Daily Activity Short Form  How much help from another person does the patient currently need…  -   Putting on and taking off regular lower body clothing?: These deficits impact the patient’s ability to participate in BADLs and functional mobility, instrumental activities of daily living, rest and sleep, work, leisure and social participation.      The patient is functioning below his previous functional level

## 2021-02-11 NOTE — PROGRESS NOTES
BEBO HOSPITALIST  Progress Note     Nancy Cutting Patient Status:  Observation    3/16/1929 MRN [id number]   [facility] 8NE-A Attending Damaso Mccarty, [address] [facility] Day # 0 PCP Ginna Patel MD     Chief Complaint: Diarrhea, nausea or vomit BILT 0.6  --    TP 7.1  --      Estimated Creatinine Clearance: 21.3 mL/min (A) (based on SCr of 1.82 mg/dL (H)). No results for input(s): PTP, INR in the last 168 hours.     Recent Labs   Lab 02/11/21  0006   TROP <0.045        Imaging: Imaging data r

## 2021-02-11 NOTE — PROGRESS NOTES
[facility] Pharmacy Note:  Renal Adjustment for ciprofloxacin (CIPRO)    Sahil Haynes is a 80year old patient who has been prescribed ciprofloxacin (CIPRO) 400 mg every 12 hrs.   The estimated creatinine clearance is 25 mL/min (A) (based on SCr of 1.86 mg/dL

## 2021-02-12 NOTE — PLAN OF CARE
Tele monitoring. Spoke with pt and family regarding update to plan of care. Pt has not had any stools at this time, unable to obtain stool for sample. Possible home soon. Bravo catheter placed at home on 2/1/21 due to chronic urinary retention.  1800 ada gl INTERVENTIONS:  - Continuous cardiac monitoring, monitor vital signs, obtain 12 lead EKG if indicated  - Evaluate effectiveness of antiarrhythmic and heart rate control medications as ordered  - Initiate emergency measures for life threatening arrhythmias Monitor I&O, WT and lab values  - Obtain nutritional consult as needed  - Evaluate psychosocial factors contributing to over-consumption  Outcome: Progressing     Problem: GENITOURINARY - ADULT  Goal: Absence of urinary retention  Description: INTERVENTION available)  - Encourage oral intake as appropriate  - Instruct patient on fluid and nutrition restrictions as appropriate  Outcome: Progressing     Problem: SKIN/TISSUE INTEGRITY - ADULT  Goal: Skin integrity remains intact  Description: INTERVENTIONS  - A None

## 2021-02-12 NOTE — PROGRESS NOTES
BEBO HOSPITALIST  Progress Note     Lit Balderrama Patient Status:  Observation    3/16/1929 MRN [id number]   [facility] 8NE-A Attending Rand Pinzon, [address] [facility] Day # 0 PCP Carlos Byrne MD     Chief Complaint: Diarrhea, nausea or vomit hours. Recent Labs   Lab 02/11/21  0006   TROP <0.045        Imaging: Imaging data reviewed in Epic.     Medications:   • Heparin Sodium (Porcine)  5,000 Units Subcutaneous Q8H [facility]   • aspirin  325 mg Oral Daily   • atorvastatin  20 mg Oral Daily   • hyd

## 2021-02-12 NOTE — PLAN OF CARE
Received bedside report on this Pt., at 1600. Pt., awake, A&Ox4, calm, pleasant and cooperative. Pt., was in SR on Tele monitor, sats greater than 92% on RA, denied any pain or distress. Discharge orders on chart, chart reviewed.  Pt's daughter Willie Lyles called

## 2021-02-12 NOTE — DISCHARGE SUMMARY
[facility] HOSPITALIST  DISCHARGE SUMMARY     Elsie Plata Patient Status:  Observation    3/16/1929 MRN [id number]   [facility] 8NE-A Attending Rebekah Vasquez, [address] [facility] Day # 0 PCP Naeem Cohen MD     Date of Admission: 2/10/2021  Date of Medication List:     Discharge Medications      START taking these medications      Instructions Prescription details   Ondansetron HCl 4 mg tablet  Commonly known as: ZOFRAN      Take 1 tablet (4 mg total) by mouth every 8 (eight) hours as needed for Naus THE MORNING BEFORE BREAKFAST.    Quantity: 90 tablet  Refills: 3           Where to Get Your Medications      These medications were sent to [address] [place] [address]  [address], [address] [facility]  [address] [facility] [place] [phone], [phone]  [address] [place] [address],[address] [facility] bring it with you to your appointment. COVID-19 Vaccine Consent Form  [email or web address] pdf     COVID-19 Vaccine Consent Form – [place]ian version  https://w

## 2021-02-12 NOTE — PROGRESS NOTES
Pt. Is a&o x4 this am. MATTHEW, RICKY MARAVILLAR on tele. No BM had throughout this shift - C.diff/stool panel outstanding - awaiting pt to have BM - patient is passing flatus & tolerating diet well - denying nausea/vomiting.  PT/OT saw today - recommend 1 assist w/ wal

## 2021-02-15 NOTE — PROGRESS NOTES
Initial Post Discharge Follow Up   Discharge Date: 2/12/21  Contact Date: 2/15/2021    Consent Verification:  Assessment Completed With: Other: daughter [place]  received per patient?  written  HIPAA Verified?   Yes    Discharge Dx:   Nausea and In Vitro Strip Tests twice daily 100 strip 1   • Albuterol Sulfate (PROAIR RESPICLICK) 909 (90 Base) MCG/ACT Inhalation Aerosol Powder, Breath Activated Inhale 2 puffs into the lungs every 4 (four) hours as needed (wheezing).  3 each 5   • ONETOUCH ULTRA BL Shahid COVID VACCINE (EDW Seven Shahid)    Important information about your COVID-19 vaccine  To minimize risk of COVID-19 exposure, please come alone to your appointment.  If you need a support partner due to limited mobility, cognitive impairment, or la version  [email or web address]. pdf            [address] [address]          [facility] TCM

## 2021-02-19 NOTE — PROGRESS NOTES
HPI:    Lashanda Segovia is a 80year old male here today for hospital follow up.    He was discharged from Inpatient hospital, [facility] to Home   Admission Date: 2/10/21   Discharge Date: 2/12/21  Hospital Discharge Diagnoses (since 1/19/2021)   Non He had no fever and his leukocytosis resolved off antibiotics. He was not treated with antibiotics. He tolerated diet and had no further nausea, vomiting or diarrhea and was discharged home. Cath and bactrim given, isseus with catherter leaking.  Veronika Yeh mother. He  reports that he quit smoking about 18 years ago. He has never used smokeless tobacco. He reports that he does not drink alcohol or use drugs. ROS:   Review of Systems   Constitutional: Negative.   Negative for activity change, appetite cornelius having Mr. Ino Barcenas maintain his aspirin, OneTouch SureSoft Lancing Dev, OneTouch UltraSoft Lancets, OneTouch Ultra Blue, Albuterol Sulfate, Glucose Blood, Pantoprazole Sodium, glyBURIDE, Metoprolol Tartrate, hydrALAzine HCl, atorvastatin, losartan

## 2021-02-23 NOTE — TELEPHONE ENCOUNTER
[facility]idential nurse had a appt at 9:00am with patient and she had car trouble and was unable to get there by 9:00 and daughter refused to give her another time today but said she can try to come Thursday at 9:00 so there is going to be a delay in the nursing

## 2021-02-26 NOTE — PROGRESS NOTES
Spoke to daughter maria esther Rodriguez per FYI. Unsure if father would benefit from program, requested more info.   Mailed CCM info to Ishan Roach    Time spent 12 min

## 2021-03-01 NOTE — TELEPHONE ENCOUNTER
Dyke Okfuskee from [facility] calling in requesting back up catheter supply along with saline and syringes. Lachelle Collado is requesting a call back today.

## 2021-03-01 NOTE — TELEPHONE ENCOUNTER
Chart reviewed and looks like urology manages gonzalez and supplies.  Advised nurse to reach out to dr. Dean kebede

## 2021-03-17 NOTE — TELEPHONE ENCOUNTER
Mikey Ritchie with [facility] [employer] needs to re-certify patient at the end of the week, coming to the end of 60 days for medicare, will continue with nursing care, home aid nurse goes once a week, new orders will come through on Friday

## 2021-03-23 NOTE — TELEPHONE ENCOUNTER
We can order that, but Medicare has other rules that sometimes the customer serices don't understand. I am OK with giving the order, but sometimes the [facility] does not over ride it.  Thanks and stay well, Laura Salinas MD

## 2021-03-23 NOTE — TELEPHONE ENCOUNTER
See separate conversation in chart telephone message, staff working this out as I have no knowledge about what she is requesting

## 2021-03-23 NOTE — TELEPHONE ENCOUNTER
From: Carmella Alves  To: Rod Atkins MD  Sent: 3/23/2021 11:41 AM CDT  Subject: Non-Urgent Medical Question    Dr. Wang Potter I just got off the phone with Sesar Hardy a Medicare Representative, she stated as long as you The  orders in home Nursing

## 2021-03-23 NOTE — TELEPHONE ENCOUNTER
See separate notes.  I have no knowledge of what is covered or not covered but [facility] usually does know what they are allowed to do

## 2021-03-23 NOTE — TELEPHONE ENCOUNTER
Pt daughter Che Vergara called [facility]    Pt daughter was told by nurse at [facility] that aide would only come 1 time weekly and nurse would no longer replace catheter after she discovered that daughter has done it in the past.    Pt has been getting two visits weekly fro

## 2021-03-23 NOTE — TELEPHONE ENCOUNTER
From: Eden Mendez  To: Lalo Meyers MD  Sent: 3/23/2021 11:06 AM CDT  Subject: Non-Urgent Medical Question    Good Morning Dr. Brody Mancuso. I am having a little problem I need your help with.  The nurse is coming every two weeks and the aid comes twice a week

## 2021-03-23 NOTE — TELEPHONE ENCOUNTER
#1 of 2 [employer] messages regarding home care    Routed to [facility]

## 2021-03-23 NOTE — PROGRESS NOTES
Pt daughter Michael Owen called in to CCM    Pt daughter had additional questions about CCM services after having received informational brochures. She is yet undecided about enrollment and will call and schedule assessment when she decides.     Daughter was Vaishnavi Aguilar

## 2021-03-23 NOTE — TELEPHONE ENCOUNTER
This message #2 of 2 [employer] messages regarding home care.     Routed to [facility]

## 2021-03-24 NOTE — PROGRESS NOTES
Pt daughter Belkis Graff called into CCM    Pt had a few more questions before deciding to enroll father into CCM. Belkis Graff is very comfortable communicating with providers through my chart and wanted assurance that she could still utilize my chart freely to do so.

## 2021-03-24 NOTE — PROGRESS NOTES
Spoke to PT daughter Di Orozco for Fathers assessment, he is hard of hearing and blind    I want to know a little about you. • What do you do for fun? Pt spends time with family. He is blind and can't here very well. • Any hobbies?  What Hobbies? n/a  • What do cheerios and fruit in the morning  • Breakfast is very important as it sets the tone for the day  • Do you feel you need to work on your eating habits? no  o What would you like to change? n/a  o (go to goal section)  • Do you eat a variety of fruits and v me anytime you have a question or need help with achieving your goals  • I will follow up with you in a few weeks to see how you are doing and if we need to change anything to help you meet your needs.   • Remember: what works for one person may not always history, and need for CCM established by Emma Olivarez MD.

## 2021-04-20 NOTE — TELEPHONE ENCOUNTER
[facility] calling they need a verbal order to continue home health aid 2x a week for 4 more weeks.

## 2021-04-20 NOTE — TELEPHONE ENCOUNTER
Left message on answering machine that Dr Raul Tavera authorizes home health aid 2 times weekly for 4 weeks. FYI: Routed to Dr Raul Tavera.

## 2021-04-27 NOTE — PROGRESS NOTES
Contacting patient to follow up on CCM for the month.  [facility]       Time with pt -  min  Chart review -  min  Total time -3  min  Total Monthly time- 3 min

## 2021-04-27 NOTE — PROGRESS NOTES
4/27/2021  Spoke to [facility] for [facility].       Updates to patient care team/ comments: UTD  Patient reported changes in medications: UTD  Med Adherence  Comment: Pt taking medications as prescribed     Health Maintenance:     Zoster Vaccines(1 of 2) Never done goal action plan.   Self-Management Abilities (patient reported)             1= least confident in achieving goal, 5= very confident               - confidence: : 4      Care Manager Follow Up: 1 month  Reason For Follow Up: review progress and or barriers

## 2021-05-17 NOTE — TELEPHONE ENCOUNTER
Karen from [facility] is calling, They want to see him weekly for nurse and twice a week for home health aid visits for bathing.

## 2021-05-17 NOTE — TELEPHONE ENCOUNTER
Spoke to Estefani Bailey RN from [facility] and Dr Mahesh Pan agrees with the plan

## 2021-06-01 NOTE — PROGRESS NOTES
6/1/2021  Spoke to Ada Jaimes for CCM.       Updates to patient care team/ comments: UTD  Patient reported changes in medications: UTD    Med Adherence  Comment: Pt taking medications as prescribed     Health Maintenance:     Zoster Vaccines(1 of 2) Never done n/a    • Patient Reported New Barriers And Concerns: none                               Patient agrees to goal action plan.   Self-Management Abilities (patient reported)             1= least confident in achieving goal, 5= very confident               - co

## 2021-07-01 NOTE — PROGRESS NOTES
7/1/2021  Spoke to Pranav for CCM.       Updates to patient care team/ comments: UTD  Patient reported changes in medications: UTD  Med Adherence  Comment: PT taking medications as prescribed     Health Maintenance:     Zoster Vaccines(1 of 2) Never done n/a    • Patient Reported New Barriers And Concerns: none                               Patient agrees to goal action plan.   Self-Management Abilities (patient reported)             1= least confident in achieving goal, 5= very confident               - co

## 2021-07-02 NOTE — PROGRESS NOTES
Pt daughter Lashanda Castillo called in to [facility]    Pt has standing appointment with Nicole Garcia at [facility] every 90 days per medicare for nail trimming. Pt daughter was interested in finding a podiatrist that does in home visits.  Pt was recommended Mar

## 2021-08-02 NOTE — PROGRESS NOTES
8/2/2021  Spoke to Sondra Massey for CCM.       Updates to patient care team/ comments: UTD  Patient reported changes in medications: UTD  Med Adherence  Comment: PT taking medications as prescribed     Health Maintenance:     Zoster Vaccines(1 of 2) Never done assistance in finding loaner shower chair if possible.     Previous goal met: ongoing     Self Management Goals/Action Plan:    • Active goal from previous outreach:                       Quality of life    • Patient reported progress toward goal: pt is sti

## 2021-08-25 NOTE — PROGRESS NOTES
Spoke to [address] nurse Carlos Dejesus was requesting updated lab work be ordered for A1c CBC CMP/BMP. Pt will have phlebotomist visit home next week. Nurse Nabila Osborn was requesting verbal confirmation once order has been written.  Liz provided cell

## 2021-08-27 NOTE — PROGRESS NOTES
Per PCP referral placed to home health for hospice referral for evaluation. Spoke to pt daughter Laura Terry to verify that if evaluation determines that pt should receive hospice, pt will be admitted.  Pt states that home health physician would manage care and umm

## 2021-09-02 NOTE — TELEPHONE ENCOUNTER
Pt and daughter had hospice assessment today. Per hospice nurse Fei Ambriz, pt in good condition and not ready for hospice care. Pt has good bp, has no sores, is alert and able to ambulate on his own.  Pt and daughter decided not to proceed based on Nurse assalfonso

## 2021-09-02 NOTE — PROGRESS NOTES
9/2/2021  Spoke to Josef Del Cid for CCM.       Updates to patient care team/ comments: UTD  Patient reported changes in medications: UTD  Med Adherence  Comment: Pt taking medications as prescribed     Health Maintenance:     Zoster Vaccines(1 of 2) Never done weeks, otherwise they would have to get order for another assessment in the future. Pt daughter did not have any concerns about the care her father is getting from home health nurses.   Previous goal met: ongoing    Self Management Goals/Action Plan:  No

## 2021-09-13 NOTE — TELEPHONE ENCOUNTER
[facility] home health is calling they are re certifying patient   For monthly full catheter changes in the home and nurses see him once a week.

## 2021-10-05 NOTE — TELEPHONE ENCOUNTER
Refill request for:    Requested Prescriptions     Pending Prescriptions Disp Refills   • PANTOPRAZOLE 40 MG Oral Tab EC [Pharmacy Med Name: Pantoprazole Sodium Oral Tablet Delayed Release 40 MG] 90 tablet 0     Sig: TAKE 1 TABLET BY MOUTH IN THE MORNING B

## 2021-10-06 NOTE — TELEPHONE ENCOUNTER
Appointment is made for [email or web address] right after her appointment @8am. I set up a video visit due to father not being able to come in the office . She's needs all his medications refilled.

## 2021-10-07 NOTE — PROGRESS NOTES
1. 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 Echo 8/15 showed EF 55%  Assessment & Plan:  Stable, continue present management   Orders:  -     Metoprolol Tartrate;  Take 1 tabl 6. Gastroesophageal reflux disease without esophagitis  -     Pantoprazole Sodium; Take 1 tablet (40 mg total) by mouth before breakfast.  Dispense: 90 tablet; Refill: 11  7.  Type 2 diabetes mellitus with complication, without long-term current use of i

## 2021-10-07 NOTE — ASSESSMENT & PLAN NOTE
Stable, Continue present management.     Blood Pressure and Cardiac Medications          Metoprolol Tartrate 100 MG Oral Tab    hydrALAzine HCl 25 MG Oral Tab    losartan Potassium 50 MG Oral Tab        x

## 2021-10-07 NOTE — ASSESSMENT & PLAN NOTE
Stable, continue present management   Lab Results   Component Value Date/Time    CREATSERUM 1.74 (H) 08/31/2021 10:45 AM    GFR 31 (L) 11/10/2017 08:37 AM    GFRNAA 33 (L) 08/31/2021 10:45 AM

## 2021-10-11 NOTE — TELEPHONE ENCOUNTER
Sonido Stoddard from [place] [address] states that the family is requesting  Home Health aid 3 times a wk for a wk until 11/17/21 (verbal requested)

## 2021-10-18 NOTE — PROGRESS NOTES
10/18/2021  Spoke to Pranav for CCM.       Updates to patient care team/ comments: UTD  Patient reported changes in medications: UTD  Med Adherence  Comment: Pt taking medications as prescribed     Health Maintenance:     Zoster Vaccines(1 of 2) Never done cognition starts to decline. Pt brother started to cognitively decline at age 80 and pt daughter afraid he will soon follow suit. At this time daughter has not seen any alarming decline in memory or cognitive abilities.  Pt is able to feed himself but his m

## 2021-10-25 NOTE — TELEPHONE ENCOUNTER
Cody Dempsey form [facility] requesting a verbal if pt can get his Flu shot through them ASAP

## 2021-11-11 NOTE — TELEPHONE ENCOUNTER
1. CKD stage 4 due to type 2 diabetes mellitus ([facility] (Primary)  Overview:  Creatinine 2.0, GFR 30, on losartan 50 mg  Orders:  -     [facility]  2.  Chronic obstructive pulmonary disease, unspecified COPD type ([facility]  Overview:  Not on meds

## 2021-11-11 NOTE — TELEPHONE ENCOUNTER
Staffing shortages have dictated that Pt CNA can only visit 1 x weekly, down from 3 x. Pt daughter Aida Arrington was advised by [facility] director to put Pt in hospice to ensure he recieves appropriate amount of care.     Please place order for Hospice Evaluation    Alma Delia Ochoa

## 2021-11-11 NOTE — PROGRESS NOTES
11/11/2021  Spoke to Pranav for CCM.       Updates to patient care team/ comments: UTD  Patient reported changes in medications: UTD  Med Adherence  Comment: Pt taking medications as prescribed     Health Maintenance:       Zoster Vaccines(1 of 2) Never do Plan:  Future Appointments   Date Time Provider Estefani Zhui   11/16/2021  4:00 PM Ema Arias MD [facility]       • Active goal from previous outreach:                       Quality of life    • Patient reported progress toward goal: p

## 2021-11-12 NOTE — PROGRESS NOTES
Spoke to pt daughter arlen. Hospice assessment is scheduled for next Thursday 11/18/21. Daughter has also purchased a sit to stand recliner that will allow pt to spend more time out of bed.  Pt daughter will keep remote when pt is in it to prevent misus

## 2021-11-15 NOTE — TELEPHONE ENCOUNTER
Isa Riddle from [facility] is requesting  a Verbal order to continue another episode of home health.

## 2021-11-18 NOTE — TELEPHONE ENCOUNTER
Rossi Canales with [facility] calling to state that family has designated Dr. Unruly French to be the primary doctor while patient is in hospice would like verification

## 2021-11-19 NOTE — TELEPHONE ENCOUNTER
I spoke to Elan Hastings who was returning [facility] call. I informed him that Dr. Eduin Adam will sign the death certificate and that was all he needed.

## 2021-11-23 NOTE — PROGRESS NOTES
Pt daughter Kishore Dodge called into CCM    Pt has been established with Hospice care. CNA will start to come to home 2 x weekly starting next week. Nurse comes to home 2x weekly, and per pcp hospice physician will be filling prescriptions.   Pt was given a new li

## 2021-11-29 NOTE — PROGRESS NOTES
Pt daughter yoly Called into [facility]    Pt was having a bout of constipation and after moving bowels pt had persistant diahrrea. Pt is under hospice care and was instructed not to take to emergency room, instead to contact hospice with any health concerns.     P

## 2021-12-20 NOTE — PROGRESS NOTES
12/20/2021  Spoke to Pranav for CCM.       Updates to patient care team/ comments: UTD  Patient reported changes in medications: UTD  Med Adherence  Comment: PT taking medications as prescribed     Health Maintenance:     Zoster Vaccines(1 of 2) Never done loss. Pt appetite has not diminished any more, pt portion sizes are not large   Pt has not had any urinary problems   Pt is sleeping well and mood appears stable and upbeat according to daughter. Pt is looking forward to family holiday traditions.    Pt erma health history, and care plan established by Trey Trevizo MD, need for CCM determined from these assessments and data.

## 2021-12-28 NOTE — TELEPHONE ENCOUNTER
ROSEMARY from hospice nurse Jackie Nolan will be visiting pt every day citing his rapid decline and sudden onset of respiratory distress that began yesterday. Pt blood pressure has been in the low 100's, his extremities are cool and breathing slightly labored and irregular. Daughter is administering morphine every hour.     Routed to PCP for [facility]

## 2021-12-29 NOTE — TELEPHONE ENCOUNTER
Called and talke dto daughter and told her Dr Eduin Adam will sign certificate she said the hospice MD will sign the death certificate. So it is being done in 1 hour.

## 2021-12-29 NOTE — TELEPHONE ENCOUNTER
Pt passed away this morning. Daughter was inquiring if you still intended to sign death certificate?     Please advise    Thank You    Routed to Dr Yonathan Alcantar

## 2021-12-31 PROCEDURE — 99490 CHRNC CARE MGMT STAFF 1ST 20: CPT

## 2022-11-02 NOTE — TELEPHONE ENCOUNTER
Requested Prescriptions     Pending Prescriptions Disp Refills   • HYDRALAZINE HCL 25 MG Oral Tab [Pharmacy Med Name: HYDRALAZINE 25 MG TABLET] 180 tablet 1     Sig: TAKE ONE TABLET BY MOUTH TWICE DAILY     LOV 8/31/2019     Patient was asked to follow-up
Detail Level: Detailed
Patient Specific Counseling (Will Not Stick From Patient To Patient): Nothing noted of clinical significance today. Advised pt to call if lesion flares
Detail Level: Simple
Detail Level: Zone

## 2024-05-07 NOTE — ASSESSMENT & PLAN NOTE
Thank you for coming to see us in the [facility] today.   We have reviewed the results of the vascular test, the blood flow to your Lt foot looks good, the Rt leg blood flow is worse than Lt, but it is stable and your wounds are healing, so there are no indications for invasive procedures at this time.  Please continue taking all your medications, follow with Dr Marquez for wound care.  You can have the Lt great toe amputated, with Dr Velasquez.  We will see you back in 1 month to reassess the healing ( ne testing needed)    Please consider quitting smoking, as it affects the arteries all over your body and can cause gangrene, stroke or heart attack.      slighltly better, will continue to follow with better liquid intake

## (undated) DEVICE — GLOVE SURG SENSICARE SZ 7

## (undated) DEVICE — CAUTERY CORD DISP E0503

## (undated) DEVICE — Device

## (undated) DEVICE — Device: Brand: OLYMPUS

## (undated) DEVICE — KENDALL SCD EXPRESS SLEEVES, KNEE LENGTH, MEDIUM: Brand: KENDALL SCD

## (undated) DEVICE — CYSTO CDS-LF: Brand: MEDLINE INDUSTRIES, INC.

## (undated) DEVICE — REM POLYHESIVE ADULT PATIENT RETURN ELECTRODE: Brand: VALLEYLAB

## (undated) NOTE — LETTER
[facility]  [address] S[address] [facility], [address] [place] [address]    Consent for Operation    Date: __________________    Time: _______________    1.  I authorize the performance upon Shoaib Douglas the following operation:    Procedure(s):  Hung Pedersen procedure has been videotaped, the surgeon will obtain the original videotape. The hospital will not be responsible for storage or maintenance of this tape.     6. For the purpose of advancing medical education, I consent to the admittance of observers to t STATEMENTS REQUIRING INSERTION OR COMPLETION WERE FILLED IN.     Signature of Patient:   ___________________________    When the patient is a minor or mentally incompetent to give consent:  Signature of person authorized to consent for patient: ____________ supplements, and pills I can buy without a prescription (including street drugs/illegal medications). Failure to inform my anesthesiologist about these medicines may increase my risk of anesthetic complications.   · If I am allergic to anything or have had Anesthesiologist Signature     Date   Time  I have discussed the procedure and information above with the patient (or patient’s representative) and answered their questions. The patient or their representative has agreed to have anesthesia services.     ___

## (undated) NOTE — MR AVS SNAPSHOT
[facility]  [facility],  [address]  [address] [place] [address] [phone]               Thank you for choosing us for your health care visit with EVELYN Morse.   We are glad to serve you and happy to provide you with this PANCREAS/PANCREATITIS PANCREATIC MASS RENAL ARTERY RETROPERITONEAL BLEED RENAL CALCULUS/CALCULI RENAL MASS RENAL STONES SPLENIC ARTERY UROGRAM (DR. Nate Mcnulty)  Patients with all other diagnoses will drink oral contrast. INSTRUCTIONS FOR PATIENTS DRINKING restroom as many times as necessary. You may want to keep the bottle refrigerated to improve the taste. Be sure to shake the bottle well before drinking.             Apr 28, 2017  7:45 AM   Exam - Established Patient with Ian Lagos MD   [facility] Dx:   Epigastric abdominal pain [R10.13], LUQ pain [R10.12], CKD stage 3 due to type 2 diabetes mellitus ([facility] [facility] [E11.22, N18.3], Gastroesophageal reflux disease without esophagitis [K21.9], Ventral hernia without obstruction or gangrene [K43.9], [place]

## (undated) NOTE — IP AVS SNAPSHOT
[facility] [place] [address] [place], [address] ~ [phone]                Discharge Summary   2/19/2017    Mr. Travis Teressa           Admission Information        Provider Department    2/19/2017 Evangelina Hull MD  [id number] CONTINUE taking these medications        Instructions Authorizing Provider    Morning Afternoon Evening As Needed    aspirin 325 MG Tbec   Last time this was given:  325 mg on 2/22/2017  8:50 AM   Next dose due:  TOMORROW        Take 1 tablet by mouth d These medications were sent to [facility] [address] [facility], [address] [facility] [phone], [address] [place] [address]     Phone:  [phone]    - acetaminophen 500 MG Tabs  - Cefuroxime Axetil 500 MG Tabs 9.3 (02/19/17)  4.02 (02/19/17)  12.7 (L) (02/19/17)  37.2 (02/19/17)  92.5    (02/19/17)  201.0       Recent Hematology Lab Results (cont.)  (Last 3 results in the past 90 days)    Neutrophil % Lymphocyte % Monocyte % Eosinophil % Basophil % Prelim Neut A Medicaid plans. To get signed up and covered, please call [phone] and ask to get set up for an insurance coverage that is in-network with Gabriela Mathis.         Erasto     Call the [employer] for assistance with your inactive [facility] account weakness, numbness           Cholesterol Lowering Medications     Atorvastatin Calcium 20 MG Oral Tab       Use: Lower cholesterol, protect your heart   Most common side effects: Dizziness, constipation, abnormal liver function   What to report to your hea

## (undated) NOTE — MR AVS SNAPSHOT
[facility]  [place],  [address] [place] [address] [phone]               Thank you for choosing us for your health care visit with Evelin Harmon MD.  We are glad to serve you and happy to provide you with this summa Your physician has referred you to a specialist.  Your physician or the clinic staff will provide you with the phone number you should call to schedule your appointment.      If you are confident that your benefit plan will not require a referral or authori Take 2 tablets (1,000 mg total) by mouth every 6 (six) hours as needed for Pain. Commonly known as:  TYLENOL EXTRA STRENGTH           aspirin 325 MG Tbec   Take 1 tablet by mouth daily.            Atorvastatin Calcium 20 MG Tabs   Take 1 tablet (20 mg tot Call the [employer] for assistance with your inactive [employer] account    If you have questions, you can call [phone] to talk to our [facility] Staff. Remember, [employer] is NOT to be used for urgent needs. For medical emergencies, dial 911.     Vi